# Patient Record
Sex: MALE | Race: WHITE | NOT HISPANIC OR LATINO | Employment: OTHER
[De-identification: names, ages, dates, MRNs, and addresses within clinical notes are randomized per-mention and may not be internally consistent; named-entity substitution may affect disease eponyms.]

---

## 2017-10-01 ENCOUNTER — RECORDS - HEALTHEAST (OUTPATIENT)
Dept: ADMINISTRATIVE | Facility: OTHER | Age: 67
End: 2017-10-01

## 2017-11-20 ENCOUNTER — RECORDS - HEALTHEAST (OUTPATIENT)
Dept: ADMINISTRATIVE | Facility: OTHER | Age: 67
End: 2017-11-20

## 2017-11-20 LAB
LAB AP CHARGES (HE HISTORICAL CONVERSION): NORMAL
PATH REPORT.COMMENTS IMP SPEC: NORMAL
PATH REPORT.COMMENTS IMP SPEC: NORMAL
PATH REPORT.FINAL DX SPEC: NORMAL
PATH REPORT.GROSS SPEC: NORMAL
PATH REPORT.MICROSCOPIC SPEC OTHER STN: NORMAL
PATH REPORT.MICROSCOPIC SPEC OTHER STN: NORMAL
PATH REPORT.RELEVANT HX SPEC: NORMAL
RESULT FLAG (HE HISTORICAL CONVERSION): NORMAL

## 2018-01-01 ENCOUNTER — OFFICE VISIT - HEALTHEAST (OUTPATIENT)
Dept: GERIATRICS | Facility: CLINIC | Age: 68
End: 2018-01-01

## 2018-01-01 ENCOUNTER — INFUSION - HEALTHEAST (OUTPATIENT)
Dept: INFUSION THERAPY | Facility: CLINIC | Age: 68
End: 2018-01-01

## 2018-01-01 ENCOUNTER — HOME CARE/HOSPICE - HEALTHEAST (OUTPATIENT)
Dept: HOME HEALTH SERVICES | Facility: HOME HEALTH | Age: 68
End: 2018-01-01

## 2018-01-01 ENCOUNTER — HOSPITAL ENCOUNTER (OUTPATIENT)
Dept: CARDIOLOGY | Facility: HOSPITAL | Age: 68
Discharge: HOME | End: 2018-11-19
Attending: INTERNAL MEDICINE

## 2018-01-01 ENCOUNTER — COMMUNICATION - HEALTHEAST (OUTPATIENT)
Dept: INFECTIOUS DISEASES | Facility: CLINIC | Age: 68
End: 2018-01-01

## 2018-01-01 ENCOUNTER — HOSPITAL ENCOUNTER (OUTPATIENT)
Dept: RADIOLOGY | Facility: HOSPITAL | Age: 68
Discharge: HOME | End: 2018-09-12
Attending: INTERNAL MEDICINE

## 2018-01-01 ENCOUNTER — COMMUNICATION - HEALTHEAST (OUTPATIENT)
Dept: ADMINISTRATIVE | Facility: CLINIC | Age: 68
End: 2018-01-01

## 2018-01-01 ENCOUNTER — RECORDS - HEALTHEAST (OUTPATIENT)
Dept: ADMINISTRATIVE | Facility: OTHER | Age: 68
End: 2018-01-01

## 2018-01-01 ENCOUNTER — OFFICE VISIT - HEALTHEAST (OUTPATIENT)
Dept: INFECTIOUS DISEASES | Facility: CLINIC | Age: 68
End: 2018-01-01

## 2018-01-01 ENCOUNTER — RECORDS - HEALTHEAST (OUTPATIENT)
Dept: LAB | Facility: CLINIC | Age: 68
End: 2018-01-01

## 2018-01-01 ENCOUNTER — SURGERY - HEALTHEAST (OUTPATIENT)
Dept: SURGERY | Facility: HOSPITAL | Age: 68
End: 2018-01-01

## 2018-01-01 ENCOUNTER — AMBULATORY - HEALTHEAST (OUTPATIENT)
Dept: INFUSION THERAPY | Facility: HOSPITAL | Age: 68
End: 2018-01-01

## 2018-01-01 ENCOUNTER — COMMUNICATION - HEALTHEAST (OUTPATIENT)
Dept: CARDIOLOGY | Facility: HOSPITAL | Age: 68
End: 2018-01-01

## 2018-01-01 ENCOUNTER — INFUSION - HEALTHEAST (OUTPATIENT)
Dept: INFUSION THERAPY | Facility: HOSPITAL | Age: 68
End: 2018-01-01

## 2018-01-01 ENCOUNTER — AMBULATORY - HEALTHEAST (OUTPATIENT)
Dept: GERIATRICS | Facility: CLINIC | Age: 68
End: 2018-01-01

## 2018-01-01 ENCOUNTER — HOSPITAL ENCOUNTER (OUTPATIENT)
Dept: CT IMAGING | Facility: HOSPITAL | Age: 68
Discharge: HOME | End: 2018-12-20
Attending: FAMILY MEDICINE

## 2018-01-01 ENCOUNTER — ANESTHESIA - HEALTHEAST (OUTPATIENT)
Dept: SURGERY | Facility: HOSPITAL | Age: 68
End: 2018-01-01

## 2018-01-01 DIAGNOSIS — R78.81 MRSA BACTEREMIA: ICD-10-CM

## 2018-01-01 DIAGNOSIS — F10.10 ALCOHOL ABUSE: ICD-10-CM

## 2018-01-01 DIAGNOSIS — I48.21 PERMANENT ATRIAL FIBRILLATION (H): ICD-10-CM

## 2018-01-01 DIAGNOSIS — B95.62 MRSA BACTEREMIA: ICD-10-CM

## 2018-01-01 DIAGNOSIS — G93.40 ENCEPHALOPATHY: ICD-10-CM

## 2018-01-01 DIAGNOSIS — N28.89 RENAL MASS: ICD-10-CM

## 2018-01-01 DIAGNOSIS — I96 GANGRENE OF TOE OF RIGHT FOOT (H): ICD-10-CM

## 2018-01-01 DIAGNOSIS — R46.89 COGNITIVE AND BEHAVIORAL CHANGES: ICD-10-CM

## 2018-01-01 DIAGNOSIS — M86.9 TOE OSTEOMYELITIS (H): ICD-10-CM

## 2018-01-01 DIAGNOSIS — F02.80 ALZHEIMER'S DEMENTIA WITHOUT BEHAVIORAL DISTURBANCE, UNSPECIFIED TIMING OF DEMENTIA ONSET: ICD-10-CM

## 2018-01-01 DIAGNOSIS — Z87.39 HISTORY OF OSTEOMYELITIS: ICD-10-CM

## 2018-01-01 DIAGNOSIS — L08.9 DIABETIC FOOT INFECTION (H): ICD-10-CM

## 2018-01-01 DIAGNOSIS — E11.628 DIABETIC FOOT INFECTION (H): ICD-10-CM

## 2018-01-01 DIAGNOSIS — Z86.79 HISTORY OF ATRIAL FIBRILLATION: ICD-10-CM

## 2018-01-01 DIAGNOSIS — R60.0 LOWER EXTREMITY EDEMA: ICD-10-CM

## 2018-01-01 DIAGNOSIS — G30.9 ALZHEIMER'S DEMENTIA WITHOUT BEHAVIORAL DISTURBANCE, UNSPECIFIED TIMING OF DEMENTIA ONSET: ICD-10-CM

## 2018-01-01 DIAGNOSIS — I10 ESSENTIAL HYPERTENSION: ICD-10-CM

## 2018-01-01 DIAGNOSIS — G47.9 SLEEP DIFFICULTIES: ICD-10-CM

## 2018-01-01 DIAGNOSIS — A41.89 GRAM POSITIVE SEPSIS (H): ICD-10-CM

## 2018-01-01 DIAGNOSIS — I35.8 AORTIC VALVE ENDOCARDITIS: ICD-10-CM

## 2018-01-01 DIAGNOSIS — F32.A DEPRESSION: ICD-10-CM

## 2018-01-01 DIAGNOSIS — R41.89 COGNITIVE AND BEHAVIORAL CHANGES: ICD-10-CM

## 2018-01-01 DIAGNOSIS — R78.81 BACTEREMIA: ICD-10-CM

## 2018-01-01 LAB
ALBUMIN SERPL-MCNC: 2 G/DL (ref 3.5–5)
ALBUMIN SERPL-MCNC: 2 G/DL (ref 3.5–5)
ALBUMIN SERPL-MCNC: 2.2 G/DL (ref 3.5–5)
ALBUMIN SERPL-MCNC: 2.2 G/DL (ref 3.5–5)
ALBUMIN SERPL-MCNC: 2.3 G/DL (ref 3.5–5)
ALP SERPL-CCNC: 107 U/L (ref 45–120)
ALP SERPL-CCNC: 109 U/L (ref 45–120)
ALP SERPL-CCNC: 121 U/L (ref 45–120)
ALP SERPL-CCNC: 129 U/L (ref 45–120)
ALP SERPL-CCNC: 139 U/L (ref 45–120)
ALT SERPL W P-5'-P-CCNC: 25 U/L (ref 0–45)
ALT SERPL W P-5'-P-CCNC: 26 U/L (ref 0–45)
ALT SERPL W P-5'-P-CCNC: 28 U/L (ref 0–45)
ALT SERPL W P-5'-P-CCNC: 32 U/L (ref 0–45)
ALT SERPL W P-5'-P-CCNC: 43 U/L (ref 0–45)
ANION GAP SERPL CALCULATED.3IONS-SCNC: 5 MMOL/L (ref 5–18)
ANION GAP SERPL CALCULATED.3IONS-SCNC: 5 MMOL/L (ref 5–18)
ANION GAP SERPL CALCULATED.3IONS-SCNC: 6 MMOL/L (ref 5–18)
ANION GAP SERPL CALCULATED.3IONS-SCNC: 6 MMOL/L (ref 5–18)
ANION GAP SERPL CALCULATED.3IONS-SCNC: 7 MMOL/L (ref 5–18)
ANION GAP SERPL CALCULATED.3IONS-SCNC: 7 MMOL/L (ref 5–18)
AORTIC ROOT: 3.2 CM
AORTIC ROOT: 3.2 CM
AST SERPL W P-5'-P-CCNC: 46 U/L (ref 0–40)
AST SERPL W P-5'-P-CCNC: 50 U/L (ref 0–40)
AST SERPL W P-5'-P-CCNC: 52 U/L (ref 0–40)
AST SERPL W P-5'-P-CCNC: 56 U/L (ref 0–40)
AST SERPL W P-5'-P-CCNC: 67 U/L (ref 0–40)
BASOPHILS # BLD AUTO: 0 THOU/UL (ref 0–0.2)
BASOPHILS # BLD AUTO: 0.1 THOU/UL (ref 0–0.2)
BASOPHILS # BLD AUTO: 0.2 THOU/UL (ref 0–0.2)
BASOPHILS # BLD AUTO: 0.2 THOU/UL (ref 0–0.2)
BASOPHILS NFR BLD AUTO: 1 % (ref 0–2)
BASOPHILS NFR BLD AUTO: 2 % (ref 0–2)
BASOPHILS NFR BLD AUTO: 2 % (ref 0–2)
BASOPHILS NFR BLD AUTO: 4 % (ref 0–2)
BILIRUB DIRECT SERPL-MCNC: 0.5 MG/DL
BILIRUB SERPL-MCNC: 0.9 MG/DL (ref 0–1)
BILIRUB SERPL-MCNC: 1 MG/DL (ref 0–1)
BILIRUB SERPL-MCNC: 1 MG/DL (ref 0–1)
BILIRUB SERPL-MCNC: 1.2 MG/DL (ref 0–1)
BILIRUB SERPL-MCNC: 1.3 MG/DL (ref 0–1)
BSA FOR ECHO PROCEDURE: 2.17 M2
BUN SERPL-MCNC: 10 MG/DL (ref 8–22)
BUN SERPL-MCNC: 11 MG/DL (ref 8–22)
BUN SERPL-MCNC: 13 MG/DL (ref 8–22)
BUN SERPL-MCNC: 14 MG/DL (ref 8–22)
BUN SERPL-MCNC: 15 MG/DL (ref 8–22)
BUN SERPL-MCNC: 16 MG/DL (ref 8–22)
C REACTIVE PROTEIN LHE: 11.7 MG/DL (ref 0–0.8)
C REACTIVE PROTEIN LHE: 2.5 MG/DL (ref 0–0.8)
C REACTIVE PROTEIN LHE: 2.7 MG/DL (ref 0–0.8)
C REACTIVE PROTEIN LHE: 3 MG/DL (ref 0–0.8)
C REACTIVE PROTEIN LHE: 5.1 MG/DL (ref 0–0.8)
CALCIUM SERPL-MCNC: 8.1 MG/DL (ref 8.5–10.5)
CALCIUM SERPL-MCNC: 8.2 MG/DL (ref 8.5–10.5)
CALCIUM SERPL-MCNC: 8.3 MG/DL (ref 8.5–10.5)
CALCIUM SERPL-MCNC: 8.3 MG/DL (ref 8.5–10.5)
CALCIUM SERPL-MCNC: 8.5 MG/DL (ref 8.5–10.5)
CALCIUM SERPL-MCNC: 8.9 MG/DL (ref 8.5–10.5)
CHLORIDE BLD-SCNC: 105 MMOL/L (ref 98–107)
CHLORIDE BLD-SCNC: 107 MMOL/L (ref 98–107)
CHLORIDE BLD-SCNC: 108 MMOL/L (ref 98–107)
CHLORIDE BLD-SCNC: 109 MMOL/L (ref 98–107)
CK SERPL-CCNC: 134 U/L (ref 30–190)
CK SERPL-CCNC: 143 U/L (ref 30–190)
CK SERPL-CCNC: 51 U/L (ref 30–190)
CK SERPL-CCNC: 73 U/L (ref 30–190)
CK SERPL-CCNC: 76 U/L (ref 30–190)
CO2 SERPL-SCNC: 25 MMOL/L (ref 22–31)
CO2 SERPL-SCNC: 26 MMOL/L (ref 22–31)
CO2 SERPL-SCNC: 27 MMOL/L (ref 22–31)
CREAT SERPL-MCNC: 1.33 MG/DL (ref 0.7–1.3)
CREAT SERPL-MCNC: 1.45 MG/DL (ref 0.7–1.3)
CREAT SERPL-MCNC: 1.51 MG/DL (ref 0.7–1.3)
CREAT SERPL-MCNC: 1.53 MG/DL (ref 0.7–1.3)
CREAT SERPL-MCNC: 1.9 MG/DL (ref 0.7–1.3)
CREAT SERPL-MCNC: 2.13 MG/DL (ref 0.7–1.3)
CV BLOOD PRESSURE: ABNORMAL MMHG
CV ECHO HEIGHT: 72 IN
CV ECHO WEIGHT: 205 LBS
DOP CALC LVOT AREA: 3.46 CM2
DOP CALC LVOT DIAMETER: 2.1 CM
DOP CALC LVOT PEAK VEL: 108 CM/S
DOP CALC LVOT STROKE VOLUME: 71.7 CM3
DOP CALCLVOT PEAK VEL VTI: 20.7 CM
EJECTION FRACTION: 68 % (ref 55–75)
EOSINOPHIL # BLD AUTO: 0.2 THOU/UL (ref 0–0.4)
EOSINOPHIL # BLD AUTO: 0.3 THOU/UL (ref 0–0.4)
EOSINOPHIL # BLD AUTO: 0.4 THOU/UL (ref 0–0.4)
EOSINOPHIL # BLD AUTO: 0.6 THOU/UL (ref 0–0.4)
EOSINOPHIL COUNT (ABSOLUTE): 0.2 THOU/UL (ref 0–0.4)
EOSINOPHIL COUNT (ABSOLUTE): 0.3 THOU/UL (ref 0–0.4)
EOSINOPHIL NFR BLD AUTO: 3 % (ref 0–6)
EOSINOPHIL NFR BLD AUTO: 5 % (ref 0–6)
EOSINOPHIL NFR BLD AUTO: 6 % (ref 0–6)
EOSINOPHIL NFR BLD AUTO: 7 % (ref 0–6)
EOSINOPHIL NFR BLD AUTO: 9 % (ref 0–6)
EOSINOPHIL NFR BLD AUTO: 9 % (ref 0–6)
ERYTHROCYTE [DISTWIDTH] IN BLOOD BY AUTOMATED COUNT: 13.9 % (ref 11–14.5)
ERYTHROCYTE [DISTWIDTH] IN BLOOD BY AUTOMATED COUNT: 14 % (ref 11–14.5)
ERYTHROCYTE [DISTWIDTH] IN BLOOD BY AUTOMATED COUNT: 14.3 % (ref 11–14.5)
ERYTHROCYTE [DISTWIDTH] IN BLOOD BY AUTOMATED COUNT: 15.9 % (ref 11–14.5)
ERYTHROCYTE [SEDIMENTATION RATE] IN BLOOD BY WESTERGREN METHOD: 35 MM/HR (ref 0–15)
ERYTHROCYTE [SEDIMENTATION RATE] IN BLOOD BY WESTERGREN METHOD: 37 MM/HR (ref 0–15)
ERYTHROCYTE [SEDIMENTATION RATE] IN BLOOD BY WESTERGREN METHOD: 41 MM/HR (ref 0–15)
ERYTHROCYTE [SEDIMENTATION RATE] IN BLOOD BY WESTERGREN METHOD: 41 MM/HR (ref 0–15)
ERYTHROCYTE [SEDIMENTATION RATE] IN BLOOD BY WESTERGREN METHOD: 48 MM/HR (ref 0–15)
FRACTIONAL SHORTENING: 36.1 % (ref 28–44)
GFR SERPL CREATININE-BSD FRML MDRD: 31 ML/MIN/1.73M2
GFR SERPL CREATININE-BSD FRML MDRD: 36 ML/MIN/1.73M2
GFR SERPL CREATININE-BSD FRML MDRD: 46 ML/MIN/1.73M2
GFR SERPL CREATININE-BSD FRML MDRD: 46 ML/MIN/1.73M2
GFR SERPL CREATININE-BSD FRML MDRD: 49 ML/MIN/1.73M2
GFR SERPL CREATININE-BSD FRML MDRD: 54 ML/MIN/1.73M2
GLUCOSE BLD-MCNC: 101 MG/DL (ref 70–125)
GLUCOSE BLD-MCNC: 105 MG/DL (ref 70–125)
GLUCOSE BLD-MCNC: 118 MG/DL (ref 70–125)
GLUCOSE BLD-MCNC: 87 MG/DL (ref 70–125)
GLUCOSE BLD-MCNC: 90 MG/DL (ref 70–125)
GLUCOSE BLD-MCNC: 93 MG/DL (ref 70–125)
HCT VFR BLD AUTO: 29.2 % (ref 40–54)
HCT VFR BLD AUTO: 31 % (ref 40–54)
HCT VFR BLD AUTO: 33.5 % (ref 40–54)
HCT VFR BLD AUTO: 33.8 % (ref 40–54)
HCT VFR BLD AUTO: 34.7 % (ref 40–54)
HCT VFR BLD AUTO: 37.1 % (ref 40–54)
HGB BLD-MCNC: 10.3 G/DL (ref 14–18)
HGB BLD-MCNC: 10.8 G/DL (ref 14–18)
HGB BLD-MCNC: 11.3 G/DL (ref 14–18)
HGB BLD-MCNC: 11.4 G/DL (ref 14–18)
HGB BLD-MCNC: 11.5 G/DL (ref 14–18)
HGB BLD-MCNC: 12.1 G/DL (ref 14–18)
INTERVENTRICULAR SEPTUM IN END DIASTOLE: 1.19 CM (ref 0.6–1)
IVS/PW RATIO: 1
LA AREA 1: 29 CM2
LA AREA 2: 17 CM2
LEFT ATRIUM LENGTH: 6.3 CM
LEFT ATRIUM SIZE: 4.7 CM
LEFT ATRIUM VOLUME INDEX: 30.7 ML/M2
LEFT ATRIUM VOLUME: 66.5 ML
LEFT VENTRICLE CARDIAC INDEX: 3.1 L/MIN/M2
LEFT VENTRICLE CARDIAC OUTPUT: 6.8 L/MIN
LEFT VENTRICLE DIASTOLIC VOLUME INDEX: 45.4 CM3/M2 (ref 34–74)
LEFT VENTRICLE DIASTOLIC VOLUME: 98.6 CM3 (ref 62–150)
LEFT VENTRICLE HEART RATE: 95 BPM
LEFT VENTRICLE MASS INDEX: 120.7 G/M2
LEFT VENTRICLE SYSTOLIC VOLUME INDEX: 14.6 CM3/M2 (ref 11–31)
LEFT VENTRICLE SYSTOLIC VOLUME: 31.6 CM3 (ref 21–61)
LEFT VENTRICULAR INTERNAL DIMENSION IN DIASTOLE: 5.35 CM (ref 4.2–5.8)
LEFT VENTRICULAR INTERNAL DIMENSION IN SYSTOLE: 3.42 CM (ref 2.5–4)
LEFT VENTRICULAR MASS: 262 G
LEFT VENTRICULAR OUTFLOW TRACT MEAN GRADIENT: 3 MMHG
LEFT VENTRICULAR OUTFLOW TRACT MEAN VELOCITY: 76.2 CM/S
LEFT VENTRICULAR OUTFLOW TRACT PEAK GRADIENT: 5 MMHG
LEFT VENTRICULAR POSTERIOR WALL IN END DIASTOLE: 1.22 CM (ref 0.6–1)
LV STROKE VOLUME INDEX: 33 ML/M2
LYMPHOCYTES # BLD AUTO: 0.8 THOU/UL (ref 0.8–4.4)
LYMPHOCYTES # BLD AUTO: 0.8 THOU/UL (ref 0.8–4.4)
LYMPHOCYTES # BLD AUTO: 1.1 THOU/UL (ref 0.8–4.4)
LYMPHOCYTES # BLD AUTO: 1.2 THOU/UL (ref 0.8–4.4)
LYMPHOCYTES # BLD AUTO: 1.3 THOU/UL (ref 0.8–4.4)
LYMPHOCYTES # BLD AUTO: 1.3 THOU/UL (ref 0.8–4.4)
LYMPHOCYTES NFR BLD AUTO: 13 % (ref 20–40)
LYMPHOCYTES NFR BLD AUTO: 17 % (ref 20–40)
LYMPHOCYTES NFR BLD AUTO: 18 % (ref 20–40)
LYMPHOCYTES NFR BLD AUTO: 23 % (ref 20–40)
LYMPHOCYTES NFR BLD AUTO: 28 % (ref 20–40)
LYMPHOCYTES NFR BLD AUTO: 34 % (ref 20–40)
MAGNESIUM SERPL-MCNC: 1.8 MG/DL (ref 1.8–2.6)
MCH RBC QN AUTO: 31.6 PG (ref 27–34)
MCH RBC QN AUTO: 32.9 PG (ref 27–34)
MCH RBC QN AUTO: 32.9 PG (ref 27–34)
MCH RBC QN AUTO: 33.3 PG (ref 27–34)
MCH RBC QN AUTO: 33.5 PG (ref 27–34)
MCH RBC QN AUTO: 33.9 PG (ref 27–34)
MCHC RBC AUTO-ENTMCNC: 32.6 G/DL (ref 32–36)
MCHC RBC AUTO-ENTMCNC: 32.6 G/DL (ref 32–36)
MCHC RBC AUTO-ENTMCNC: 34 G/DL (ref 32–36)
MCHC RBC AUTO-ENTMCNC: 34 G/DL (ref 32–36)
MCHC RBC AUTO-ENTMCNC: 34.8 G/DL (ref 32–36)
MCHC RBC AUTO-ENTMCNC: 35.3 G/DL (ref 32–36)
MCV RBC AUTO: 101 FL (ref 80–100)
MCV RBC AUTO: 96 FL (ref 80–100)
MCV RBC AUTO: 96 FL (ref 80–100)
MCV RBC AUTO: 97 FL (ref 80–100)
MCV RBC AUTO: 97 FL (ref 80–100)
MCV RBC AUTO: 98 FL (ref 80–100)
MITRAL REGURGITANT VELOCITY TIME INTEGRAL: 143 CM
MITRAL VALVE E/A RATIO: 1.6
MONOCYTES # BLD AUTO: 0.1 THOU/UL (ref 0–0.9)
MONOCYTES # BLD AUTO: 0.2 THOU/UL (ref 0–0.9)
MONOCYTES # BLD AUTO: 0.3 THOU/UL (ref 0–0.9)
MONOCYTES # BLD AUTO: 0.4 THOU/UL (ref 0–0.9)
MONOCYTES # BLD AUTO: 0.4 THOU/UL (ref 0–0.9)
MONOCYTES # BLD AUTO: 0.5 THOU/UL (ref 0–0.9)
MONOCYTES NFR BLD AUTO: 2 % (ref 2–10)
MONOCYTES NFR BLD AUTO: 5 % (ref 2–10)
MONOCYTES NFR BLD AUTO: 6 % (ref 2–10)
MONOCYTES NFR BLD AUTO: 7 % (ref 2–10)
MONOCYTES NFR BLD AUTO: 8 % (ref 2–10)
MONOCYTES NFR BLD AUTO: 9 % (ref 2–10)
MR FLOW: 32 CM3
MR MEAN GRADIENT: 76 MMHG
MR MEAN VELOCITY: 413 CM/S
MR PEAK GRADIENT: 108.6 MMHG
MV DECELERATION TIME: 212 MS
MV PEAK A VELOCITY: 54.1 CM/S
MV PEAK E VELOCITY: 85.9 CM/S
NEUTROPHILS # BLD AUTO: 1.6 THOU/UL (ref 2–7.7)
NEUTROPHILS # BLD AUTO: 3.8 THOU/UL (ref 2–7.7)
NEUTROPHILS # BLD AUTO: 4 THOU/UL (ref 2–7.7)
NEUTROPHILS # BLD AUTO: 4.6 THOU/UL (ref 2–7.7)
NEUTROPHILS NFR BLD AUTO: 47 % (ref 50–70)
NEUTROPHILS NFR BLD AUTO: 66 % (ref 50–70)
NEUTROPHILS NFR BLD AUTO: 66 % (ref 50–70)
NEUTROPHILS NFR BLD AUTO: 67 % (ref 50–70)
NUC REST DIASTOLIC VOLUME INDEX: 3280 LBS
NUC REST SYSTOLIC VOLUME INDEX: 72 IN
PHOSPHATE SERPL-MCNC: 3.7 MG/DL (ref 2.5–4.5)
PISA MR PEAK VEL: 521 CM/S
PLAT MORPH BLD: ABNORMAL
PLAT MORPH BLD: ABNORMAL
PLATELET # BLD AUTO: 111 THOU/UL (ref 140–440)
PLATELET # BLD AUTO: 113 THOU/UL (ref 140–440)
PLATELET # BLD AUTO: 125 THOU/UL (ref 140–440)
PLATELET # BLD AUTO: 128 THOU/UL (ref 140–440)
PLATELET # BLD AUTO: 141 THOU/UL (ref 140–440)
PLATELET # BLD AUTO: 87 THOU/UL (ref 140–440)
PMV BLD AUTO: 10.4 FL (ref 8.5–12.5)
PMV BLD AUTO: 10.5 FL (ref 8.5–12.5)
PMV BLD AUTO: 11.2 FL (ref 8.5–12.5)
PMV BLD AUTO: 11.3 FL (ref 8.5–12.5)
PMV BLD AUTO: 11.3 FL (ref 8.5–12.5)
PMV BLD AUTO: 11.6 FL (ref 8.5–12.5)
POTASSIUM BLD-SCNC: 3.1 MMOL/L (ref 3.5–5)
POTASSIUM BLD-SCNC: 3.4 MMOL/L (ref 3.5–5)
POTASSIUM BLD-SCNC: 3.6 MMOL/L (ref 3.5–5)
POTASSIUM BLD-SCNC: 3.6 MMOL/L (ref 3.5–5)
POTASSIUM BLD-SCNC: 4 MMOL/L (ref 3.5–5)
POTASSIUM BLD-SCNC: 4.7 MMOL/L (ref 3.5–5)
PROT SERPL-MCNC: 6.6 G/DL (ref 6–8)
PROT SERPL-MCNC: 6.6 G/DL (ref 6–8)
PROT SERPL-MCNC: 7.2 G/DL (ref 6–8)
PROT SERPL-MCNC: 7.4 G/DL (ref 6–8)
PROT SERPL-MCNC: 7.7 G/DL (ref 6–8)
RBC # BLD AUTO: 3.04 MILL/UL (ref 4.4–6.2)
RBC # BLD AUTO: 3.22 MILL/UL (ref 4.4–6.2)
RBC # BLD AUTO: 3.45 MILL/UL (ref 4.4–6.2)
RBC # BLD AUTO: 3.47 MILL/UL (ref 4.4–6.2)
RBC # BLD AUTO: 3.58 MILL/UL (ref 4.4–6.2)
RBC # BLD AUTO: 3.68 MILL/UL (ref 4.4–6.2)
SODIUM SERPL-SCNC: 137 MMOL/L (ref 136–145)
SODIUM SERPL-SCNC: 139 MMOL/L (ref 136–145)
SODIUM SERPL-SCNC: 140 MMOL/L (ref 136–145)
SODIUM SERPL-SCNC: 141 MMOL/L (ref 136–145)
TOTAL NEUTROPHILS-ABS(DIFF): 1.7 THOU/UL (ref 2–7.7)
TOTAL NEUTROPHILS-ABS(DIFF): 4.5 THOU/UL (ref 2–7.7)
TOTAL NEUTROPHILS-REL(DIFF): 58 % (ref 50–70)
TOTAL NEUTROPHILS-REL(DIFF): 76 % (ref 50–70)
TRICUSPID REGURGITATION PEAK PRESSURE GRADIENT: 29.8 MMHG
TRICUSPID VALVE ANULAR PLANE SYSTOLIC EXCURSION: 1.7 CM
TRICUSPID VALVE PEAK REGURGITANT VELOCITY: 273 CM/S
WBC: 3 THOU/UL (ref 4–11)
WBC: 3.5 THOU/UL (ref 4–11)
WBC: 5.9 THOU/UL (ref 4–11)
WBC: 5.9 THOU/UL (ref 4–11)
WBC: 6 THOU/UL (ref 4–11)
WBC: 7 THOU/UL (ref 4–11)

## 2018-01-01 ASSESSMENT — MIFFLIN-ST. JEOR
SCORE: 1692.95
SCORE: 1715.63
SCORE: 1795.46
SCORE: 1523.76
SCORE: 1730.6
SCORE: 1739.21
SCORE: 1722.88
SCORE: 1751.91
SCORE: 1739.21
SCORE: 1673.44
SCORE: 1741.94
SCORE: 1727.87
SCORE: 1759.63

## 2018-02-22 ENCOUNTER — RECORDS - HEALTHEAST (OUTPATIENT)
Dept: LAB | Facility: CLINIC | Age: 68
End: 2018-02-22

## 2018-02-22 LAB
CHOLEST SERPL-MCNC: 167 MG/DL
FASTING STATUS PATIENT QL REPORTED: NORMAL
HDLC SERPL-MCNC: 53 MG/DL
LDLC SERPL CALC-MCNC: 102 MG/DL
TRIGL SERPL-MCNC: 62 MG/DL

## 2018-02-27 ENCOUNTER — AMBULATORY - HEALTHEAST (OUTPATIENT)
Dept: CARDIOLOGY | Facility: CLINIC | Age: 68
End: 2018-02-27

## 2018-04-05 ENCOUNTER — TRANSFERRED RECORDS (OUTPATIENT)
Dept: HEALTH INFORMATION MANAGEMENT | Facility: CLINIC | Age: 68
End: 2018-04-05

## 2018-04-06 ENCOUNTER — RECORDS - HEALTHEAST (OUTPATIENT)
Dept: ADMINISTRATIVE | Facility: OTHER | Age: 68
End: 2018-04-06

## 2018-04-09 ENCOUNTER — OFFICE VISIT - HEALTHEAST (OUTPATIENT)
Dept: CARDIOLOGY | Facility: CLINIC | Age: 68
End: 2018-04-09

## 2018-04-09 DIAGNOSIS — I48.19 PERSISTENT ATRIAL FIBRILLATION (H): ICD-10-CM

## 2018-04-09 ASSESSMENT — MIFFLIN-ST. JEOR: SCORE: 1836.74

## 2018-04-13 ENCOUNTER — HOSPITAL ENCOUNTER (OUTPATIENT)
Dept: CARDIOLOGY | Facility: HOSPITAL | Age: 68
Discharge: HOME | End: 2018-04-13
Attending: INTERNAL MEDICINE

## 2018-04-13 DIAGNOSIS — I48.19 PERSISTENT ATRIAL FIBRILLATION (H): ICD-10-CM

## 2019-01-01 ENCOUNTER — ALLIED HEALTH/NURSE VISIT (OUTPATIENT)
Dept: TRANSPLANT | Facility: CLINIC | Age: 69
End: 2019-01-01
Attending: INTERNAL MEDICINE
Payer: COMMERCIAL

## 2019-01-01 ENCOUNTER — COMMITTEE REVIEW (OUTPATIENT)
Dept: TRANSPLANT | Facility: CLINIC | Age: 69
End: 2019-01-01

## 2019-01-01 ENCOUNTER — MEDICAL CORRESPONDENCE (OUTPATIENT)
Dept: HEALTH INFORMATION MANAGEMENT | Facility: CLINIC | Age: 69
End: 2019-01-01

## 2019-01-01 ENCOUNTER — AMBULATORY - HEALTHEAST (OUTPATIENT)
Dept: CARDIOLOGY | Facility: CLINIC | Age: 69
End: 2019-01-01

## 2019-01-01 ENCOUNTER — ANCILLARY PROCEDURE (OUTPATIENT)
Dept: ULTRASOUND IMAGING | Facility: CLINIC | Age: 69
End: 2019-01-01
Payer: COMMERCIAL

## 2019-01-01 ENCOUNTER — HOSPITAL ENCOUNTER (OUTPATIENT)
Dept: CARDIOLOGY | Facility: CLINIC | Age: 69
Discharge: HOME | End: 2019-02-11
Attending: INTERNAL MEDICINE | Admitting: INTERNAL MEDICINE
Payer: COMMERCIAL

## 2019-01-01 ENCOUNTER — TRANSFERRED RECORDS (OUTPATIENT)
Dept: HEALTH INFORMATION MANAGEMENT | Facility: CLINIC | Age: 69
End: 2019-01-01

## 2019-01-01 ENCOUNTER — HOSPITAL ENCOUNTER (OUTPATIENT)
Dept: CARDIOLOGY | Facility: CLINIC | Age: 69
Discharge: HOME | End: 2019-02-12
Attending: INTERNAL MEDICINE | Admitting: INTERNAL MEDICINE
Payer: COMMERCIAL

## 2019-01-01 ENCOUNTER — HOSPITAL ENCOUNTER (OUTPATIENT)
Dept: NUCLEAR MEDICINE | Facility: CLINIC | Age: 69
Setting detail: NUCLEAR MEDICINE
End: 2019-02-12
Attending: INTERNAL MEDICINE
Payer: COMMERCIAL

## 2019-01-01 ENCOUNTER — ANCILLARY PROCEDURE (OUTPATIENT)
Dept: GENERAL RADIOLOGY | Facility: CLINIC | Age: 69
End: 2019-01-01
Payer: COMMERCIAL

## 2019-01-01 ENCOUNTER — ANCILLARY PROCEDURE (OUTPATIENT)
Dept: BONE DENSITY | Facility: CLINIC | Age: 69
End: 2019-01-01
Payer: COMMERCIAL

## 2019-01-01 ENCOUNTER — COMMUNICATION - HEALTHEAST (OUTPATIENT)
Dept: ADMINISTRATIVE | Facility: CLINIC | Age: 69
End: 2019-01-01

## 2019-01-01 ENCOUNTER — DOCUMENTATION ONLY (OUTPATIENT)
Dept: TRANSPLANT | Facility: CLINIC | Age: 69
End: 2019-01-01

## 2019-01-01 ENCOUNTER — TELEPHONE (OUTPATIENT)
Dept: TRANSPLANT | Facility: CLINIC | Age: 69
End: 2019-01-01

## 2019-01-01 ENCOUNTER — ANCILLARY PROCEDURE (OUTPATIENT)
Dept: MRI IMAGING | Facility: CLINIC | Age: 69
End: 2019-01-01
Attending: PSYCHIATRY & NEUROLOGY
Payer: COMMERCIAL

## 2019-01-01 ENCOUNTER — OFFICE VISIT (OUTPATIENT)
Dept: NEUROPSYCHOLOGY | Facility: CLINIC | Age: 69
End: 2019-01-01
Payer: COMMERCIAL

## 2019-01-01 ENCOUNTER — OFFICE VISIT (OUTPATIENT)
Dept: CARDIOLOGY | Facility: CLINIC | Age: 69
End: 2019-01-01
Attending: INTERNAL MEDICINE
Payer: COMMERCIAL

## 2019-01-01 ENCOUNTER — OFFICE VISIT (OUTPATIENT)
Dept: TRANSPLANT | Facility: CLINIC | Age: 69
End: 2019-01-01
Attending: TRANSPLANT SURGERY
Payer: COMMERCIAL

## 2019-01-01 ENCOUNTER — REFERRAL (OUTPATIENT)
Dept: TRANSPLANT | Facility: CLINIC | Age: 69
End: 2019-01-01

## 2019-01-01 ENCOUNTER — DOCUMENTATION ONLY (OUTPATIENT)
Dept: CARE COORDINATION | Facility: CLINIC | Age: 69
End: 2019-01-01

## 2019-01-01 ENCOUNTER — ANCILLARY PROCEDURE (OUTPATIENT)
Dept: CT IMAGING | Facility: CLINIC | Age: 69
End: 2019-01-01
Attending: INTERNAL MEDICINE
Payer: COMMERCIAL

## 2019-01-01 ENCOUNTER — OFFICE VISIT (OUTPATIENT)
Dept: NEUROLOGY | Facility: CLINIC | Age: 69
End: 2019-01-01
Attending: INTERNAL MEDICINE
Payer: COMMERCIAL

## 2019-01-01 ENCOUNTER — OFFICE VISIT (OUTPATIENT)
Dept: GASTROENTEROLOGY | Facility: CLINIC | Age: 69
End: 2019-01-01
Attending: INTERNAL MEDICINE
Payer: COMMERCIAL

## 2019-01-01 VITALS
HEIGHT: 72 IN | TEMPERATURE: 97.5 F | HEART RATE: 79 BPM | SYSTOLIC BLOOD PRESSURE: 136 MMHG | DIASTOLIC BLOOD PRESSURE: 77 MMHG | OXYGEN SATURATION: 100 % | BODY MASS INDEX: 25.09 KG/M2 | RESPIRATION RATE: 16 BRPM | WEIGHT: 185.2 LBS

## 2019-01-01 VITALS — BODY MASS INDEX: 29.26 KG/M2 | HEIGHT: 72 IN | WEIGHT: 216 LBS

## 2019-01-01 VITALS
HEART RATE: 72 BPM | HEIGHT: 72 IN | BODY MASS INDEX: 23.98 KG/M2 | DIASTOLIC BLOOD PRESSURE: 70 MMHG | OXYGEN SATURATION: 99 % | TEMPERATURE: 97.6 F | WEIGHT: 177 LBS | SYSTOLIC BLOOD PRESSURE: 110 MMHG

## 2019-01-01 VITALS
TEMPERATURE: 97.6 F | HEART RATE: 72 BPM | BODY MASS INDEX: 24.03 KG/M2 | DIASTOLIC BLOOD PRESSURE: 70 MMHG | HEIGHT: 72 IN | SYSTOLIC BLOOD PRESSURE: 110 MMHG | OXYGEN SATURATION: 99 % | WEIGHT: 177.4 LBS

## 2019-01-01 DIAGNOSIS — I63.9 CEREBROVASCULAR ACCIDENT (CVA), UNSPECIFIED MECHANISM (H): ICD-10-CM

## 2019-01-01 DIAGNOSIS — K74.60 LIVER CIRRHOSIS SECONDARY TO NASH (H): ICD-10-CM

## 2019-01-01 DIAGNOSIS — K74.60 CIRRHOSIS OF LIVER WITH ASCITES, UNSPECIFIED HEPATIC CIRRHOSIS TYPE (H): Primary | ICD-10-CM

## 2019-01-01 DIAGNOSIS — F02.80 ALZHEIMER'S DEMENTIA WITHOUT BEHAVIORAL DISTURBANCE, UNSPECIFIED TIMING OF DEMENTIA ONSET: Primary | ICD-10-CM

## 2019-01-01 DIAGNOSIS — I48.21 PERMANENT ATRIAL FIBRILLATION (H): ICD-10-CM

## 2019-01-01 DIAGNOSIS — I48.20 CHRONIC ATRIAL FIBRILLATION (H): Primary | ICD-10-CM

## 2019-01-01 DIAGNOSIS — R18.8 CIRRHOSIS OF LIVER WITH ASCITES, UNSPECIFIED HEPATIC CIRRHOSIS TYPE (H): ICD-10-CM

## 2019-01-01 DIAGNOSIS — I48.91 ATRIAL FIBRILLATION (H): ICD-10-CM

## 2019-01-01 DIAGNOSIS — G51.0 BELL'S PALSY: ICD-10-CM

## 2019-01-01 DIAGNOSIS — K74.60 CIRRHOSIS OF LIVER WITH ASCITES, UNSPECIFIED HEPATIC CIRRHOSIS TYPE (H): ICD-10-CM

## 2019-01-01 DIAGNOSIS — R18.8 OTHER ASCITES: ICD-10-CM

## 2019-01-01 DIAGNOSIS — F02.80 LATE ONSET ALZHEIMER'S DISEASE WITHOUT BEHAVIORAL DISTURBANCE (H): ICD-10-CM

## 2019-01-01 DIAGNOSIS — K74.60 CIRRHOSIS OF LIVER (H): ICD-10-CM

## 2019-01-01 DIAGNOSIS — G30.1 LATE ONSET ALZHEIMER'S DISEASE WITHOUT BEHAVIORAL DISTURBANCE (H): ICD-10-CM

## 2019-01-01 DIAGNOSIS — R41.3 MEMORY LOSS: ICD-10-CM

## 2019-01-01 DIAGNOSIS — K76.6 PORTAL HYPERTENSION (H): ICD-10-CM

## 2019-01-01 DIAGNOSIS — R18.8 CIRRHOSIS OF LIVER WITH ASCITES, UNSPECIFIED HEPATIC CIRRHOSIS TYPE (H): Primary | ICD-10-CM

## 2019-01-01 DIAGNOSIS — R94.01 EEG ABNORMALITY: ICD-10-CM

## 2019-01-01 DIAGNOSIS — K74.60 CIRRHOSIS OF LIVER (H): Primary | ICD-10-CM

## 2019-01-01 DIAGNOSIS — Z01.810 PRE-OPERATIVE CARDIOVASCULAR EXAMINATION: Primary | ICD-10-CM

## 2019-01-01 DIAGNOSIS — K75.81 LIVER CIRRHOSIS SECONDARY TO NASH (H): ICD-10-CM

## 2019-01-01 DIAGNOSIS — Z01.818 ENCOUNTER FOR PRE-TRANSPLANT EVALUATION FOR CHRONIC LIVER DISEASE: Primary | ICD-10-CM

## 2019-01-01 DIAGNOSIS — R91.1 LUNG NODULE: ICD-10-CM

## 2019-01-01 DIAGNOSIS — I48.20 CHRONIC ATRIAL FIBRILLATION (H): ICD-10-CM

## 2019-01-01 DIAGNOSIS — G30.9 ALZHEIMER'S DEMENTIA WITHOUT BEHAVIORAL DISTURBANCE, UNSPECIFIED TIMING OF DEMENTIA ONSET: Primary | ICD-10-CM

## 2019-01-01 DIAGNOSIS — G92.9 TOXIC ENCEPHALOPATHY: Primary | ICD-10-CM

## 2019-01-01 DIAGNOSIS — R41.3 MEMORY LOSS: Primary | ICD-10-CM

## 2019-01-01 DIAGNOSIS — Z76.82 AWAITING ORGAN TRANSPLANT STATUS: Primary | ICD-10-CM

## 2019-01-01 LAB
ABO + RH BLD: NORMAL
AFP SERPL-MCNC: 2.1 UG/L (ref 0–8)
ALBUMIN SERPL-MCNC: 2.7 G/DL (ref 3.4–5)
ALBUMIN UR-MCNC: NEGATIVE MG/DL
ALP SERPL-CCNC: 154 U/L (ref 40–150)
ALT SERPL W P-5'-P-CCNC: 50 U/L (ref 0–70)
ANION GAP SERPL CALCULATED.3IONS-SCNC: 7 MMOL/L (ref 3–14)
APPEARANCE UR: ABNORMAL
AST SERPL W P-5'-P-CCNC: 63 U/L (ref 0–45)
BILIRUB DIRECT SERPL-MCNC: 0.3 MG/DL (ref 0–0.2)
BILIRUB SERPL-MCNC: 0.6 MG/DL (ref 0.2–1.3)
BILIRUB UR QL STRIP: NEGATIVE
BLD GP AB SCN SERPL QL: NORMAL
BLD GP AB SCN TITR SERPL: NORMAL {TITER}
BLOOD BANK CMNT PATIENT-IMP: NORMAL
BUN SERPL-MCNC: 23 MG/DL (ref 7–30)
CALCIUM SERPL-MCNC: 8.4 MG/DL (ref 8.5–10.1)
CHLORIDE SERPL-SCNC: 106 MMOL/L (ref 94–109)
CHOLEST SERPL-MCNC: 95 MG/DL
CMV IGG SERPL QL IA: 5.1 AI (ref 0–0.8)
CO2 SERPL-SCNC: 27 MMOL/L (ref 20–32)
COLOR UR AUTO: YELLOW
CREAT SERPL-MCNC: 1.81 MG/DL (ref 0.66–1.25)
CREAT UR-MCNC: 208 MG/DL
DEPRECATED CALCIDIOL+CALCIFEROL SERPL-MC: 20 UG/L (ref 20–75)
EBV VCA IGG SER QL IA: >8 AI (ref 0–0.8)
ERYTHROCYTE [DISTWIDTH] IN BLOOD BY AUTOMATED COUNT: 14.5 % (ref 10–15)
ETHYL GLUCURONIDE UR QL: NEGATIVE
GAMMA INTERFERON BACKGROUND BLD IA-ACNC: 0.03 IU/ML
GFR SERPL CREATININE-BSD FRML MDRD: 38 ML/MIN/{1.73_M2}
GLUCOSE SERPL-MCNC: 99 MG/DL (ref 70–99)
GLUCOSE UR STRIP-MCNC: NEGATIVE MG/DL
HCT VFR BLD AUTO: 34.1 % (ref 40–53)
HDLC SERPL-MCNC: 43 MG/DL
HGB BLD-MCNC: 11 G/DL (ref 13.3–17.7)
HGB UR QL STRIP: NEGATIVE
HIV 1+2 AB+HIV1 P24 AG SERPL QL IA: NONREACTIVE
HYALINE CASTS #/AREA URNS LPF: 78 /LPF (ref 0–2)
INR PPP: 1.91 (ref 0.86–1.14)
INTERPRETATION ECG - MUSE: NORMAL
IRON SATN MFR SERPL: 78 % (ref 15–46)
IRON SERPL-MCNC: 108 UG/DL (ref 35–180)
KETONES UR STRIP-MCNC: 5 MG/DL
LDLC SERPL CALC-MCNC: 41 MG/DL
LEUKOCYTE ESTERASE UR QL STRIP: NEGATIVE
M TB IFN-G BLD-IMP: NEGATIVE
M TB IFN-G CD4+ BCKGRND COR BLD-ACNC: 6.63 IU/ML
MCH RBC QN AUTO: 32 PG (ref 26.5–33)
MCHC RBC AUTO-ENTMCNC: 32.3 G/DL (ref 31.5–36.5)
MCV RBC AUTO: 99 FL (ref 78–100)
MITOGEN IGNF BCKGRD COR BLD-ACNC: 0 IU/ML
MITOGEN IGNF BCKGRD COR BLD-ACNC: 0 IU/ML
MUCOUS THREADS #/AREA URNS LPF: PRESENT /LPF
NITRATE UR QL: NEGATIVE
NONHDLC SERPL-MCNC: 52 MG/DL
PH UR STRIP: 5 PH (ref 5–7)
PHOSPHATE SERPL-MCNC: 3.8 MG/DL (ref 2.5–4.5)
PLATELET # BLD AUTO: 100 10E9/L (ref 150–450)
POTASSIUM SERPL-SCNC: 4.1 MMOL/L (ref 3.4–5.3)
PROT SERPL-MCNC: 7.4 G/DL (ref 6.8–8.8)
PROT UR-MCNC: 0.2 G/L
PROT/CREAT 24H UR: 0.09 G/G CR (ref 0–0.2)
PSA SERPL-ACNC: 0.18 UG/L (ref 0–4)
RADIOLOGIST FLAGS: NORMAL
RBC # BLD AUTO: 3.44 10E12/L (ref 4.4–5.9)
RBC #/AREA URNS AUTO: <1 /HPF (ref 0–2)
SODIUM SERPL-SCNC: 140 MMOL/L (ref 133–144)
SOURCE: ABNORMAL
SP GR UR STRIP: 1.02 (ref 1–1.03)
SPECIMEN EXP DATE BLD: NORMAL
SPECIMEN EXP DATE BLD: NORMAL
SQUAMOUS #/AREA URNS AUTO: <1 /HPF (ref 0–1)
T PALLIDUM AB SER QL: NONREACTIVE
T4 FREE SERPL-MCNC: 0.85 NG/DL (ref 0.76–1.46)
TIBC SERPL-MCNC: 139 UG/DL (ref 240–430)
TRANSFERRIN SERPL-MCNC: 114 MG/DL (ref 210–360)
TRIGL SERPL-MCNC: 56 MG/DL
TSH SERPL DL<=0.005 MIU/L-ACNC: 5.96 MU/L (ref 0.4–4)
UROBILINOGEN UR STRIP-MCNC: 0 MG/DL (ref 0–2)
WBC # BLD AUTO: 3.3 10E9/L (ref 4–11)
WBC #/AREA URNS AUTO: 2 /HPF (ref 0–5)

## 2019-01-01 PROCEDURE — 93018 CV STRESS TEST I&R ONLY: CPT | Performed by: INTERNAL MEDICINE

## 2019-01-01 PROCEDURE — 82105 ALPHA-FETOPROTEIN SERUM: CPT | Performed by: INTERNAL MEDICINE

## 2019-01-01 PROCEDURE — 86901 BLOOD TYPING SEROLOGIC RH(D): CPT | Performed by: INTERNAL MEDICINE

## 2019-01-01 PROCEDURE — 93308 TTE F-UP OR LMTD: CPT | Mod: 26 | Performed by: INTERNAL MEDICINE

## 2019-01-01 PROCEDURE — 82306 VITAMIN D 25 HYDROXY: CPT | Performed by: INTERNAL MEDICINE

## 2019-01-01 PROCEDURE — 40000866 ZZHCL STATISTIC HIV 1/2 ANTIGEN/ANTIBODY PRETRANSPLANT ONLY: Performed by: INTERNAL MEDICINE

## 2019-01-01 PROCEDURE — 93321 DOPPLER ECHO F-UP/LMTD STD: CPT | Mod: 26 | Performed by: INTERNAL MEDICINE

## 2019-01-01 PROCEDURE — G0103 PSA SCREENING: HCPCS | Performed by: INTERNAL MEDICINE

## 2019-01-01 PROCEDURE — 84439 ASSAY OF FREE THYROXINE: CPT | Performed by: INTERNAL MEDICINE

## 2019-01-01 PROCEDURE — 80048 BASIC METABOLIC PNL TOTAL CA: CPT | Performed by: INTERNAL MEDICINE

## 2019-01-01 PROCEDURE — 83550 IRON BINDING TEST: CPT | Performed by: INTERNAL MEDICINE

## 2019-01-01 PROCEDURE — 86644 CMV ANTIBODY: CPT | Performed by: INTERNAL MEDICINE

## 2019-01-01 PROCEDURE — 84100 ASSAY OF PHOSPHORUS: CPT | Performed by: INTERNAL MEDICINE

## 2019-01-01 PROCEDURE — 40000141 ZZH STATISTIC PERIPHERAL IV START W/O US GUIDANCE

## 2019-01-01 PROCEDURE — 34300033 ZZH RX 343: Performed by: INTERNAL MEDICINE

## 2019-01-01 PROCEDURE — 78452 HT MUSCLE IMAGE SPECT MULT: CPT | Mod: 26 | Performed by: INTERNAL MEDICINE

## 2019-01-01 PROCEDURE — 93308 TTE F-UP OR LMTD: CPT

## 2019-01-01 PROCEDURE — 83540 ASSAY OF IRON: CPT | Performed by: INTERNAL MEDICINE

## 2019-01-01 PROCEDURE — 86665 EPSTEIN-BARR CAPSID VCA: CPT | Performed by: INTERNAL MEDICINE

## 2019-01-01 PROCEDURE — 80061 LIPID PANEL: CPT | Performed by: INTERNAL MEDICINE

## 2019-01-01 PROCEDURE — 80307 DRUG TEST PRSMV CHEM ANLYZR: CPT | Performed by: INTERNAL MEDICINE

## 2019-01-01 PROCEDURE — 81001 URINALYSIS AUTO W/SCOPE: CPT | Mod: XU | Performed by: INTERNAL MEDICINE

## 2019-01-01 PROCEDURE — 0064U ANTB TP TOTAL&RPR IA QUAL: CPT | Performed by: INTERNAL MEDICINE

## 2019-01-01 PROCEDURE — 85610 PROTHROMBIN TIME: CPT | Performed by: INTERNAL MEDICINE

## 2019-01-01 PROCEDURE — 86850 RBC ANTIBODY SCREEN: CPT | Performed by: INTERNAL MEDICINE

## 2019-01-01 PROCEDURE — 25000128 H RX IP 250 OP 636: Performed by: INTERNAL MEDICINE

## 2019-01-01 PROCEDURE — 36415 COLL VENOUS BLD VENIPUNCTURE: CPT | Performed by: INTERNAL MEDICINE

## 2019-01-01 PROCEDURE — G0463 HOSPITAL OUTPT CLINIC VISIT: HCPCS | Mod: ZF

## 2019-01-01 PROCEDURE — 93016 CV STRESS TEST SUPVJ ONLY: CPT | Performed by: INTERNAL MEDICINE

## 2019-01-01 PROCEDURE — 93325 DOPPLER ECHO COLOR FLOW MAPG: CPT | Mod: 26 | Performed by: INTERNAL MEDICINE

## 2019-01-01 PROCEDURE — 93017 CV STRESS TEST TRACING ONLY: CPT

## 2019-01-01 PROCEDURE — 78452 HT MUSCLE IMAGE SPECT MULT: CPT

## 2019-01-01 PROCEDURE — 84443 ASSAY THYROID STIM HORMONE: CPT | Performed by: INTERNAL MEDICINE

## 2019-01-01 PROCEDURE — 86480 TB TEST CELL IMMUN MEASURE: CPT | Performed by: INTERNAL MEDICINE

## 2019-01-01 PROCEDURE — 86900 BLOOD TYPING SEROLOGIC ABO: CPT | Mod: 91 | Performed by: INTERNAL MEDICINE

## 2019-01-01 PROCEDURE — 86886 COOMBS TEST INDIRECT TITER: CPT | Performed by: INTERNAL MEDICINE

## 2019-01-01 PROCEDURE — 84466 ASSAY OF TRANSFERRIN: CPT | Performed by: INTERNAL MEDICINE

## 2019-01-01 PROCEDURE — A9502 TC99M TETROFOSMIN: HCPCS | Performed by: INTERNAL MEDICINE

## 2019-01-01 PROCEDURE — 85027 COMPLETE CBC AUTOMATED: CPT | Performed by: INTERNAL MEDICINE

## 2019-01-01 PROCEDURE — 84156 ASSAY OF PROTEIN URINE: CPT | Mod: XU | Performed by: INTERNAL MEDICINE

## 2019-01-01 PROCEDURE — 99204 OFFICE O/P NEW MOD 45 MIN: CPT | Mod: ZP | Performed by: INTERNAL MEDICINE

## 2019-01-01 PROCEDURE — 80076 HEPATIC FUNCTION PANEL: CPT | Performed by: INTERNAL MEDICINE

## 2019-01-01 RX ORDER — CHOLECALCIFEROL (VITAMIN D3) 50 MCG
2000 TABLET ORAL
COMMUNITY

## 2019-01-01 RX ORDER — LACTULOSE 10 G/15ML
SOLUTION ORAL
Refills: 3 | COMMUNITY
Start: 2019-01-01

## 2019-01-01 RX ORDER — ATROPINE SULFATE 0.4 MG/ML
.2-2 AMPUL (ML) INJECTION
Status: DISCONTINUED | OUTPATIENT
Start: 2019-01-01 | End: 2019-01-01 | Stop reason: HOSPADM

## 2019-01-01 RX ORDER — REGADENOSON 0.08 MG/ML
0.4 INJECTION, SOLUTION INTRAVENOUS ONCE
Status: COMPLETED | OUTPATIENT
Start: 2019-01-01 | End: 2019-01-01

## 2019-01-01 RX ORDER — MULTIVIT-MIN/IRON/FOLIC ACID/K 18-600-40
CAPSULE ORAL
COMMUNITY

## 2019-01-01 RX ORDER — ATORVASTATIN CALCIUM 10 MG/1
TABLET, FILM COATED ORAL
Refills: 4 | COMMUNITY
Start: 2018-01-01

## 2019-01-01 RX ORDER — FUROSEMIDE 40 MG
TABLET ORAL
Refills: 0 | COMMUNITY
Start: 2018-01-01

## 2019-01-01 RX ORDER — MULTIVIT-MIN/FOLIC/VIT K/LYCOP 400-300MCG
1 TABLET ORAL
COMMUNITY

## 2019-01-01 RX ORDER — POTASSIUM CHLORIDE 1500 MG/1
TABLET, EXTENDED RELEASE ORAL
Refills: 0 | COMMUNITY
Start: 2018-01-01

## 2019-01-01 RX ORDER — LACTULOSE 10 G/15ML
20 SOLUTION ORAL
COMMUNITY
Start: 2019-01-01

## 2019-01-01 RX ORDER — SODIUM CHLORIDE 9 MG/ML
INJECTION, SOLUTION INTRAVENOUS CONTINUOUS
Status: ACTIVE | OUTPATIENT
Start: 2019-01-01 | End: 2019-01-01

## 2019-01-01 RX ORDER — DOBUTAMINE HYDROCHLORIDE 200 MG/100ML
10-50 INJECTION INTRAVENOUS CONTINUOUS
Status: ACTIVE | OUTPATIENT
Start: 2019-01-01 | End: 2019-01-01

## 2019-01-01 RX ORDER — ESCITALOPRAM OXALATE 10 MG/1
15 TABLET ORAL
COMMUNITY
Start: 2018-01-01

## 2019-01-01 RX ORDER — DONEPEZIL HYDROCHLORIDE 10 MG/1
10 TABLET, FILM COATED ORAL
COMMUNITY

## 2019-01-01 RX ORDER — CHOLECALCIFEROL (VITAMIN D3) 25 MCG
TABLET ORAL
Refills: 99 | COMMUNITY
Start: 2018-01-01

## 2019-01-01 RX ORDER — LOSARTAN POTASSIUM 25 MG/1
25 TABLET ORAL DAILY
Refills: 3 | COMMUNITY
Start: 2018-01-01

## 2019-01-01 RX ORDER — LANOLIN ALCOHOL/MO/W.PET/CERES
CREAM (GRAM) TOPICAL
Refills: 0 | COMMUNITY
Start: 2018-01-01

## 2019-01-01 RX ORDER — SUCRALFATE 1 G/1
TABLET ORAL
COMMUNITY
Start: 2019-01-01

## 2019-01-01 RX ORDER — SPIRONOLACTONE 100 MG/1
100 TABLET, FILM COATED ORAL DAILY
Refills: 3 | COMMUNITY
Start: 2019-01-01

## 2019-01-01 RX ORDER — METOPROLOL TARTRATE 25 MG/1
12.5 TABLET, FILM COATED ORAL
COMMUNITY
Start: 2018-01-04

## 2019-01-01 RX ORDER — METOPROLOL TARTRATE 1 MG/ML
1-20 INJECTION, SOLUTION INTRAVENOUS
Status: ACTIVE | OUTPATIENT
Start: 2019-01-01 | End: 2019-01-01

## 2019-01-01 RX ORDER — ESCITALOPRAM OXALATE 10 MG/1
TABLET ORAL
Refills: 6 | COMMUNITY
Start: 2019-01-01

## 2019-01-01 RX ORDER — DONEPEZIL HYDROCHLORIDE 10 MG/1
TABLET, FILM COATED ORAL
Refills: 99 | COMMUNITY
Start: 2018-01-01

## 2019-01-01 RX ORDER — APIXABAN 5 MG/1
TABLET, FILM COATED ORAL
Refills: 99 | COMMUNITY
Start: 2018-01-01

## 2019-01-01 RX ORDER — ALBUTEROL SULFATE 90 UG/1
2 AEROSOL, METERED RESPIRATORY (INHALATION) EVERY 5 MIN PRN
Status: DISCONTINUED | OUTPATIENT
Start: 2019-01-01 | End: 2019-01-01 | Stop reason: HOSPADM

## 2019-01-01 RX ORDER — ACYCLOVIR 200 MG/1
0-1 CAPSULE ORAL
Status: DISCONTINUED | OUTPATIENT
Start: 2019-01-01 | End: 2019-01-01 | Stop reason: HOSPADM

## 2019-01-01 RX ORDER — ATORVASTATIN CALCIUM 10 MG/1
10 TABLET, FILM COATED ORAL
COMMUNITY

## 2019-01-01 RX ORDER — SUCRALFATE 1 G/1
1 TABLET ORAL
COMMUNITY
Start: 2019-01-01

## 2019-01-01 RX ADMIN — TETROFOSMIN 42 MCI.: 1.38 INJECTION, POWDER, LYOPHILIZED, FOR SOLUTION INTRAVENOUS at 14:07

## 2019-01-01 RX ADMIN — REGADENOSON 0.4 MG: 0.08 INJECTION, SOLUTION INTRAVENOUS at 14:03

## 2019-01-01 RX ADMIN — TETROFOSMIN 11 MCI.: 1.38 INJECTION, POWDER, LYOPHILIZED, FOR SOLUTION INTRAVENOUS at 13:35

## 2019-01-01 SDOH — HEALTH STABILITY: MENTAL HEALTH: HOW OFTEN DO YOU HAVE A DRINK CONTAINING ALCOHOL?: NEVER

## 2019-01-01 ASSESSMENT — ENCOUNTER SYMPTOMS
STIFFNESS: 0
FATIGUE: 1
POLYDIPSIA: 0
INCREASED ENERGY: 0
DECREASED APPETITE: 0
WEAKNESS: 0
ORTHOPNEA: 0
NIGHT SWEATS: 0
POLYPHAGIA: 0
SLEEP DISTURBANCES DUE TO BREATHING: 0
TREMORS: 0
WEIGHT GAIN: 0
NERVOUS/ANXIOUS: 0
SPEECH CHANGE: 0
INSOMNIA: 0
HYPERTENSION: 0
ALTERED TEMPERATURE REGULATION: 1
SEIZURES: 0
BACK PAIN: 0
LOSS OF CONSCIOUSNESS: 0
EXERCISE INTOLERANCE: 1
DECREASED CONCENTRATION: 1
TINGLING: 0
POOR WOUND HEALING: 0
SYNCOPE: 0
PALPITATIONS: 0
NIGHT SWEATS: 0
LEG PAIN: 0
SPEECH CHANGE: 0
HALLUCINATIONS: 0
ARTHRALGIAS: 0
TREMORS: 0
DIZZINESS: 0
TINGLING: 0
MEMORY LOSS: 1
PANIC: 0
NUMBNESS: 0
INCREASED ENERGY: 1
JOINT SWELLING: 0
NAIL CHANGES: 0
DISTURBANCES IN COORDINATION: 0
DECREASED APPETITE: 0
NECK PAIN: 0
SEIZURES: 0
LIGHT-HEADEDNESS: 0
ALTERED TEMPERATURE REGULATION: 1
MUSCLE WEAKNESS: 1
PARALYSIS: 0
HYPOTENSION: 1
DIZZINESS: 1
WEAKNESS: 1
PARALYSIS: 0
SKIN CHANGES: 0
DISTURBANCES IN COORDINATION: 0
HALLUCINATIONS: 0
WEIGHT LOSS: 0
FEVER: 0
MEMORY LOSS: 1
CHILLS: 0
HEADACHES: 1
POLYDIPSIA: 0
LOSS OF CONSCIOUSNESS: 0
FEVER: 0
DEPRESSION: 0
FATIGUE: 1
WEIGHT LOSS: 1
POLYPHAGIA: 0
MUSCLE CRAMPS: 0
CHILLS: 0
HEADACHES: 0
NUMBNESS: 0
MYALGIAS: 0
WEIGHT GAIN: 0

## 2019-01-01 ASSESSMENT — MIFFLIN-ST. JEOR
SCORE: 1617.68
SCORE: 1615.87
SCORE: 1792.77
SCORE: 1648.19

## 2019-01-01 ASSESSMENT — PAIN SCALES - GENERAL
PAINLEVEL: NO PAIN (0)
PAINLEVEL: NO PAIN (0)

## 2019-01-07 NOTE — LETTER
2019    Gilmar Turk  [address] [place] [address]      Dear Gilmar,    Thank you for your interest in the Transplant Center at [facility], [facility]. We look forward to being a part of your care team and assisting you through the transplant process.    As we discussed, your transplant coordinator is Yuri Eddy Jr. (Liver).  You may call your coordinator at any time with questions or concerns, call [phone].    Please complete the followin. Fill out and return the enclosed forms    Authorization for Electronic Communication    Authorization to Discuss Protected Health Information    Authorization for Release of Protected Health Information    Authorization for Care Everywhere Release of Information    2. Sign up for:    [employer], access to your electronic medical record (see enclosed pamphlet)    [email or web address], a transplant education website    You can use these tools to learn more about your transplant, communicate with your care team, and track your medical details      Sincerely,      Solid Organ Transplant  [facility], [facility]    cc: Referring Physician PCP

## 2019-01-07 NOTE — TELEPHONE ENCOUNTER
Below is referral note from Dr. Dana Nicolas who is sending over patient from  for liver txp eval:    Please see late paragraph and make sure to call daughter.     Hi, I hope all of you had a good holiday! 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 Also with CKD since September. He is a complex alex, but barriers I foresee for transplant are his A.fib (on Eliquis) so needs cards clearance, and Alzheimers which  has gotten better so they re questioning the diagnosis  and a history of stroke so definitely needs neuro clearance. He does use a cane/walker. He has some muscle wasting but is working on that. I have him on a twice weekly tap schedule, I ve stopped the Lasix his PCP gave as his Cr has been bad since September, urgently working on getting him to nephrology here. I am scheduling screening EGD as well. I m not putting him through colonoscopy yet as I m not feeling strongly that he will be a good transplant candidate given the abovementioned issues. My note is detailed and outlines everything since last year so refer to that for more details. I am happy to do whatever testing is necessary through our [facility] system given that his daughter works with us and can get it scheduled quick. I just didn t do all of the transplant workup just yet.    Finally, Dr. Marks, this is Dee our  s father. She did request you specifically so I put that in my order. For Mauricio and Cheri, Dee is one of our GI schedulers here. She is on the chart as POA and please call her as the first person (given the Alzheimer s etc). I know she will need to fill out appropriate POA paperwork with you guys as well but wanted to give you a heads up. Her name is Dee Marinelli, cell [phone].

## 2019-01-08 NOTE — TELEPHONE ENCOUNTER
Patient was asked the following questions during liver intake call.     Referring Provider: Ninfa Nicolas MD  Referring Diagnosis: VOGEL  PCP: Leyda Cespedes MD     1)Do you know why you have liver disease: Yes             If Alcoholic Cirrhosis is present when was your last drink: NA             Have you ever been through treatment for alcohol: NA  2) Presence of Ascites: Yes Paracentesis: Yes  3) Presence of Hepatic Encephalopathy: Yes Medications: No  4) History of GI Bleeding: No  5) EGD: No Where: NA Records: NA  6) Colonoscopy: No Where NA RecordsNA  7) MELD Score: 19  8) Insurance information:Medicare      Policy urbina:Self      Subscriber/policy/ID number: [id number]      Group Number:     Referral intake process completed.  Patient is aware that after financial approval is received, medical records will be requested.   Patient confirmed for a callback from transplant coordinator on 1/16/2019.  Tentative evaluation date TBD.  Confirmed coordinator will discuss evaluation process in more detail at the time of their call.   Patient is aware of the need to arrange age appropriate cancer screening, vaccinations, and dental care.  Reminded patient to complete questionnaire, complete medical records release, and review packet prior to evaluation visit .  Assessed patient for special needs (ie--wheelchair, assistance, guardian, and ):  No   Patient instructed to call [phone] with questions.

## 2019-01-16 PROBLEM — I48.91 ATRIAL FIBRILLATION (H): Status: ACTIVE | Noted: 2019-01-01

## 2019-01-16 PROBLEM — K74.60 LIVER CIRRHOSIS SECONDARY TO NASH (H): Status: ACTIVE | Noted: 2019-01-01

## 2019-01-16 PROBLEM — G51.0 BELL'S PALSY: Status: ACTIVE | Noted: 2019-01-01

## 2019-01-16 PROBLEM — R18.8 OTHER ASCITES: Status: ACTIVE | Noted: 2019-01-01

## 2019-01-16 PROBLEM — K75.81 LIVER CIRRHOSIS SECONDARY TO NASH (H): Status: ACTIVE | Noted: 2019-01-01

## 2019-01-16 NOTE — TELEPHONE ENCOUNTER
69 y/o with VOGEL cirrhosis referred by Dr. Nicolas at . See CE for detailed report from her, labs, and imaging at both  and [facility].  Started November 2018 - toe amputation 8/27/18 due to gangrene that seems to have tipped over the liver. Patient may have been having symptoms for last couple years though.    Social drinker of alcohol most of adult life, none recently  Would drink at least a case Pepsi per week    MELD 19      Kids - son and daughter    Ascites - yes  Taps - bi-weekly  HE - on meds  GIBS - no  EGD - 1/14/19 in [facility]  Colonoscopy - due (had approx 20 yrs ago)    Head - none confirmed  Heart - A.Fib  Lung - smoker (cigar once a month approx) stopped last summer  Kidney - hit with decompensation and stable at best (Cr 1.3-1.5)  Cancer - no    metoprolol tartrate (LOPRESSOR) 25 MG tablet         Plan as discussed with daughter: full evaluation with Dr. Marks on 2/12

## 2019-02-11 NOTE — LETTER
Date:February 19, 2019      Provider requested that no letter be sent. Do not send.       [facility] Health Information

## 2019-02-11 NOTE — LETTER
2/11/2019       RE: Gilmar Turk  [address] [facility] [place] [address]     Dear Colleague,    Thank you for referring your patient, Gilmar Turk, to the [facility] SOLID ORGAN TRANSPLANT at [facility]. Please see a copy of my visit note below.    Liver Transplant Evaluation/Teaching    TEACHING TOPICS: Evaluation Process, Evaluation Items, Diagnostic Studies, Consultation, Chemical Dependency Policy, CD Eval, Donor Source, Liver Allocation, MELD System, UNOS, Waiting List, Follow up while on transplant list, Follow up after transplantation, Infection and Rejection, Immunosuppression , Medication Teaching, Lab Recording after transplant, Laboratory Frequency after transplant , Consent for evaluation and One year survival rates  INSTRUCTIONAL MATERIAL USED/GIVEN: Liver Transplant Handbook, MELD Booklet, Donor Booklet, Web Sites Options, Verbal instructions, Multiple Listing Brochure , Consent for evaluation signed, One year survival rates and SRTR (Scientific Registry) Data  Person(s) involved in teaching: patient, his wife, his daughter and family  Asks Questions: YES  Eager to Learn: YES  Cooperative: YES  Receptive (willing/able to accept information): YES  Reason for the appointment, diagnosis and treatment plan:YES  Knowledge of proper use of medications and conditions for which they are ordered (with special attention to potential side effects or drug interactions): YES  Which situations necessitate calling provider and whom to contact:YES  Other: NA  Teaching Concerns Addressed   Comments: Patient, his wife, his daughter and family attended transplant class. Asked excellent questions. Eval consent signed.   Proper use and care of (medical equip, care aids, etc.):YES  Nutritional needs and diet plan: YES  Pain management techniques: YES  Wound Care: YES  How and/when to access community resources: YES  Patient is aware of and agrees to required commitment to post-op  care and long term follow-up: YES  Patient has name and phone number of transplant coordinator.   Time Spent face-to-face teachin minutes.  Cheri Garcia RN  Liver Transplant Coordinator                 Again, thank you for allowing me to participate in the care of your patient.      Sincerely,    Transplant Class

## 2019-02-12 PROBLEM — L02.31 ABSCESS OF BUTTOCK: Status: ACTIVE | Noted: 2019-01-01

## 2019-02-12 PROBLEM — A41.89 GRAM POSITIVE SEPSIS (H): Status: ACTIVE | Noted: 2019-01-01

## 2019-02-12 PROBLEM — K74.60 ESOPHAGEAL VARICES IN CIRRHOSIS (H): Status: ACTIVE | Noted: 2019-01-01

## 2019-02-12 PROBLEM — E87.20 LACTIC ACID ACIDOSIS: Status: ACTIVE | Noted: 2018-01-01

## 2019-02-12 PROBLEM — L08.9 DIABETIC FOOT INFECTION (H): Status: ACTIVE | Noted: 2019-01-01

## 2019-02-12 PROBLEM — N17.9 AKI (ACUTE KIDNEY INJURY) (H): Status: ACTIVE | Noted: 2018-01-01

## 2019-02-12 PROBLEM — D72.829 LEUKOCYTOSIS, UNSPECIFIED TYPE: Status: ACTIVE | Noted: 2018-01-01

## 2019-02-12 PROBLEM — B95.62 MRSA BACTEREMIA: Status: ACTIVE | Noted: 2019-01-01

## 2019-02-12 PROBLEM — F02.80 ALZHEIMER'S DEMENTIA WITHOUT BEHAVIORAL DISTURBANCE, UNSPECIFIED TIMING OF DEMENTIA ONSET: Status: ACTIVE | Noted: 2019-01-01

## 2019-02-12 PROBLEM — M86.9 OSTEOMYELITIS OF FOOT (H): Status: ACTIVE | Noted: 2018-01-01

## 2019-02-12 PROBLEM — Z86.79 HISTORY OF ATRIAL FIBRILLATION: Status: ACTIVE | Noted: 2019-01-01

## 2019-02-12 PROBLEM — M62.81 GENERALIZED MUSCLE WEAKNESS: Status: ACTIVE | Noted: 2019-01-01

## 2019-02-12 PROBLEM — E11.628 DIABETIC FOOT INFECTION (H): Status: ACTIVE | Noted: 2019-01-01

## 2019-02-12 PROBLEM — F32.A DEPRESSION: Status: ACTIVE | Noted: 2019-01-01

## 2019-02-12 PROBLEM — R46.89 COGNITIVE AND BEHAVIORAL CHANGES: Status: ACTIVE | Noted: 2019-01-01

## 2019-02-12 PROBLEM — G93.40 ENCEPHALOPATHY: Status: ACTIVE | Noted: 2019-01-01

## 2019-02-12 PROBLEM — G47.9 SLEEP DIFFICULTIES: Status: ACTIVE | Noted: 2019-01-01

## 2019-02-12 PROBLEM — Z87.39 HISTORY OF OSTEOMYELITIS: Status: ACTIVE | Noted: 2019-01-01

## 2019-02-12 PROBLEM — F10.10 ALCOHOL ABUSE: Status: ACTIVE | Noted: 2018-04-09

## 2019-02-12 PROBLEM — G30.9 ALZHEIMER'S DEMENTIA WITHOUT BEHAVIORAL DISTURBANCE, UNSPECIFIED TIMING OF DEMENTIA ONSET: Status: ACTIVE | Noted: 2019-01-01

## 2019-02-12 PROBLEM — K75.81 NASH (NONALCOHOLIC STEATOHEPATITIS): Status: ACTIVE | Noted: 2019-01-01

## 2019-02-12 PROBLEM — I85.10 ESOPHAGEAL VARICES IN CIRRHOSIS (H): Status: ACTIVE | Noted: 2019-01-01

## 2019-02-12 PROBLEM — R55 SYNCOPE: Status: ACTIVE | Noted: 2018-01-01

## 2019-02-12 PROBLEM — I96 GANGRENE OF TOE OF RIGHT FOOT (H): Status: ACTIVE | Noted: 2018-01-01

## 2019-02-12 PROBLEM — R41.89 COGNITIVE AND BEHAVIORAL CHANGES: Status: ACTIVE | Noted: 2019-01-01

## 2019-02-12 PROBLEM — R78.81 MRSA BACTEREMIA: Status: ACTIVE | Noted: 2019-01-01

## 2019-02-12 NOTE — PROGRESS NOTES
Assessment and Plan:  1. liver transplant evaluation - patient is a fair candidate overall. Benefits and surgical risks of a liver transplantation were discussed.  2.  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 Cardiac: Has history of a.fib  On elquis.  Questionable vegetation on  Needs cardiology work-up for ischemic cardiac disease and valvular/rhythm control   4. History of stroke:  Originally thought to be bells palsy, but MRI showed right frontal lesion.  Repeat MRI?  Possibly embolic.  5. Memory loss:  Questionable Alzheimers disease vs encephalopathy.  Needs neuropsych eval and neurology appt.  Recommend appt with with Kimberly   6. History of MRSA bacteremia: Follow-up with ID.   7.  Ascites:  Recommend paracentesis as needed   8.  History of alcohol abuse:  Has a history of a DWI and lost license.  Recommend CD eval.  Appreciate social work input..    9. Great toe amputation: sounds like poor shoe fit/infection, but should have LE doppler US to look for occlusive disease.  May also have been embolic.  Needs routine screening for infection and neoplasm    Surgical evaluation:  1. Portal Vein:Patent  2. Hepatic Artery: Open  3. TIPS: absent  4. Previous Abdominal Surgery: No  5. Hepatocellular Carcinoma: None  6. Ascites: Present - large amount  7. Costal Angle: wide  8. Portopulmonary Hypertension: absent  9. Hepatopulmonary Syndrome: absent  10. Cardiac Evaluation: needs heart catheterization  11. Nutritional Status: Moderate  12. Diabetes: Previously was borderline  13.Hypertension  Yes   14. Smoker: Yes. Smoker cigars   15: Fraility index: Yes              Patients overall evaluation will be discussed at the Liver Transplant selection committee meeting with a final recommendation on the patients suitability for transplant to be made at that time.      Kenyetta Jay, CNP   Consult Full  Details:  Gilmar Turk was seen in consultation at the request of Dr. Nicolas  for  evaluation as a potential liver transplant recipient.    HPI:   Gilmar Turk is a 68 year old year old male with nonalcoholic steatopheatitis, who presents for liver transplant evaluation.     Patient was diagnosed with liver disease in December 2018 at this time patient had a CT scan of his abdomen and was noted.  Cirrhosis of his liver.  Patient had originally presented to his primary care provider with increased abdominal distention, however this was found to be a large volume ascites.    Since his diagnosis patient has been getting weekly paracentesis he typically is tapped for around 4-5 L.      Patient denies any history of significant alcohol abuse however upon reviewing his chart it does state the patient drank a number of beers per day however family denies that this is true..    Patient has a history of memory impairment however according to patient's daughter this was likely due to hepatic encephalopathy as his Carson score did improve upon retest.    Patient does not answer many questions and relies primarily on his family.          Past Medical History:   Diagnosis Date     Atrial fibrillation (H) 1/16/2019     Bell's palsy 1/16/2019     HTN (hypertension)      Liver cirrhosis secondary to VOGEL (H) 1/16/2019     Other ascites 1/16/2019     Pre-diabetes      Past Surgical History:   Procedure Laterality Date     AMPUTATION      Great toe on right foot.      COLONOSCOPY      most likely at age 51 - due     Past Surgical History:   Procedure Laterality Date     AMPUTATION      Great toe on right foot.      COLONOSCOPY      most likely at age 51 - due     Family History   Problem Relation Age of Onset     Diabetes Type 2  Mother      Bronchitis Mother      Heart Failure Mother      Kidney failure Father      Diabetes Type 2  Father      No Known Allergies  Prior to Admission medications    Medication Sig Start Date End Date Taking? Authorizing Provider   apixaban ANTICOAGULANT (ELIQUIS) 5 MG tablet Take 5  mg by mouth    Reported, Patient   atorvastatin (LIPITOR) 10 MG tablet Take 10 mg by mouth    Reported, Patient   atorvastatin (LIPITOR) 10 MG tablet TAKE 1 TABLET BY MOUTH ONCE A DAY ORALLY 11/23/18   Reported, Patient   Cholecalciferol (VITAMIN D) 2000 units CAPS     Reported, Patient   DOCOSAHEXAENOIC ACID PO Take 2 g by mouth    Reported, Patient   donepezil (ARICEPT) 10 MG tablet Take 10 mg by mouth    Reported, Patient   donepezil (ARICEPT) 10 MG tablet TAKE 1 TAB BY MOUTH ONCE DAILY 9/20/18   Reported, Patient   ELIQUIS 5 MG tablet TAKE 1 TAB BY MOUTH TWICE A DAY 9/20/18   Reported, Patient   escitalopram (LEXAPRO) 10 MG tablet Take 15 mg by mouth 8/24/18   Reported, Patient   escitalopram (LEXAPRO) 10 MG tablet TAKE 1.5 TABLET BY MOUTH ONCE DAILY 2/6/19   Reported, Patient   furosemide (LASIX) 40 MG tablet TAKE ONE-HALF TO 1 TABLET BY MOUTH DAILY 12/21/18   Reported, Patient   lactulose (CHRONULAC) 10 GM/15ML solution Take 20 g by mouth 1/9/19   Reported, Patient   lactulose (CHRONULAC) 10 GM/15ML solution TAKE 30 ML BY MOUTH THREE TIMES A DAY. 1/9/19   Reported, Patient   losartan (COZAAR) 25 MG tablet Take 25 mg by mouth daily 6/25/18   Reported, Patient   melatonin 3 MG tablet TAKE 1 TAB BY MOUTH AT BEDTIME FOR SEVEN DAYS 9/20/18   Reported, Patient   melatonin 5 MG tablet     Reported, Patient   metoprolol tartrate (LOPRESSOR) 25 MG tablet 12.5 mg 1/4/18   Reported, Patient   Multiple Vitamins-Minerals (CENTRUM) TABS     Reported, Patient   Multiple Vitamins-Minerals ([employer] DAILY MULTIPLE FOR MEN) TABS Take 1 tablet by mouth    Reported, Patient   potassium chloride ER (K-DUR/KLOR-CON M) 20 MEQ CR tablet TAKE 1 TAB BY MOUTH NOW AND THEN TAKE ANOTHER IN 6 HOURS 10/1/18   Reported, Patient   rifaximin (XIFAXAN) 550 MG TABS tablet Take 550 mg by mouth 2/4/19   Reported, Patient   spironolactone (ALDACTONE) 100 MG tablet Take 100 mg by mouth daily 1/11/19   Reported, Patient   sucralfate (CARAFATE) 1 GM tablet   2/10/19   Reported, Patient   sucralfate (CARAFATE) 1 GM tablet Take 1 g by mouth 1/15/19   Reported, Patient   vitamin D3 (CHOLECALCIFEROL) 2000 units tablet Take 2,000 Units by mouth    Reported, Patient   VITAMIN D3 1000 units tablet TAKE 2 TABS (2000UNITS) BY MOUTH ONCE DAILY 9/20/18   Reported, Patient       Previous Transplant Hx: No    Cardiovascular Hx:       h/o Cardiac Issues: No       Exercise Tolerance: shortness of breath with exertion.    Potential Donor(s): Yes -  [facility]    ROS:    REVIEW OF SYSTEMS (check box if normal)  [x]                GENERAL  [x]                  PULMONARY [x]                 GENITOURINARY  [x]                 CNS                 [x]                  CARDIAC  [x]                  ENDOCRINE  [x]                 EARS,NOSE,THROAT [x]                  GASTROINTESTINAL [x]                  NEUROLOGIC    [x]                 MUSCLOSKELTAL  [x]                   HEMATOLOGY    Examination:     Vitals:  There were no vitals taken for this visit.    GENERAL APPEARANCE: alert and no distress  EYES: PERRL  HENT: mouth without ulcers or lesions  NECK: supple, no adenopathy  RESP: lungs clear to auscultation - no rales, rhonchi or wheezes  CV: regular rhythm, normal rate, no rub   ABDOMEN:  soft, nontender, no HSM or masses and bowel sounds normal. Large volume ascites   MS: extremities normal- no gross deformities noted, no evidence of inflammation in joints, no muscle tenderness  SKIN: no rash  NEURO: Normal strength and tone, sensory exam grossly normal, mentation intact and speech normal  PSYCH: mentation appears normal. and affect normal/bright      Results:   Recent Results (from the past 168 hour(s))   X-ray Chest 2 vws [IMG36]    Collection Time: 02/11/19  7:07 AM   Result Value Ref Range    Radiologist flags Lung nodule    Treponema Abs w Reflex to RPR and Titer    Collection Time: 02/11/19  7:14 AM   Result Value Ref Range    Treponema Antibodies  Nonreactive NR^Nonreactive   HIV Antigen Antibody Combo Pretransplant    Collection Time: 02/11/19  7:14 AM   Result Value Ref Range    HIV Antigen Antibody Combo Pretransplant Nonreactive NR^Nonreactive   EBV Capsid Antibody IgG    Collection Time: 02/11/19  7:14 AM   Result Value Ref Range    EBV Capsid Antibody IgG >8.0 (H) 0.0 - 0.8 AI   CMV Antibody IgG    Collection Time: 02/11/19  7:14 AM   Result Value Ref Range    CMV Antibody IgG 5.1 (H) 0.0 - 0.8 AI   CBC with platelets    Collection Time: 02/11/19  7:14 AM   Result Value Ref Range    WBC 3.3 (L) 4.0 - 11.0 10e9/L    RBC Count 3.44 (L) 4.4 - 5.9 10e12/L    Hemoglobin 11.0 (L) 13.3 - 17.7 g/dL    Hematocrit 34.1 (L) 40.0 - 53.0 %    MCV 99 78 - 100 fl    MCH 32.0 26.5 - 33.0 pg    MCHC 32.3 31.5 - 36.5 g/dL    RDW 14.5 10.0 - 15.0 %    Platelet Count 100 (L) 150 - 450 10e9/L   INR    Collection Time: 02/11/19  7:14 AM   Result Value Ref Range    INR 1.91 (H) 0.86 - 1.14   Vitamin D Deficiency    Collection Time: 02/11/19  7:14 AM   Result Value Ref Range    Vitamin D Deficiency screening 20 20 - 75 ug/L   Transferrin    Collection Time: 02/11/19  7:14 AM   Result Value Ref Range    Transferrin 114 (L) 210 - 360 mg/dL   TSH with free T4 reflex    Collection Time: 02/11/19  7:14 AM   Result Value Ref Range    TSH 5.96 (H) 0.40 - 4.00 mU/L   Prostate spec antigen screen    Collection Time: 02/11/19  7:14 AM   Result Value Ref Range    PSA 0.18 0 - 4 ug/L   Phosphorus    Collection Time: 02/11/19  7:14 AM   Result Value Ref Range    Phosphorus 3.8 2.5 - 4.5 mg/dL   Iron and iron binding capacity    Collection Time: 02/11/19  7:14 AM   Result Value Ref Range    Iron 108 35 - 180 ug/dL    Iron Binding Cap 139 (L) 240 - 430 ug/dL    Iron Saturation Index 78 (H) 15 - 46 %   AFP tumor marker    Collection Time: 02/11/19  7:14 AM   Result Value Ref Range    Alpha Fetoprotein 2.1 0 - 8 ug/L   Hepatic panel    Collection Time: 02/11/19  7:14 AM   Result Value Ref  Range    Bilirubin Direct 0.3 (H) 0.0 - 0.2 mg/dL    Bilirubin Total 0.6 0.2 - 1.3 mg/dL    Albumin 2.7 (L) 3.4 - 5.0 g/dL    Protein Total 7.4 6.8 - 8.8 g/dL    Alkaline Phosphatase 154 (H) 40 - 150 U/L    ALT 50 0 - 70 U/L    AST 63 (H) 0 - 45 U/L   Basic metabolic panel    Collection Time: 02/11/19  7:14 AM   Result Value Ref Range    Sodium 140 133 - 144 mmol/L    Potassium 4.1 3.4 - 5.3 mmol/L    Chloride 106 94 - 109 mmol/L    Carbon Dioxide 27 20 - 32 mmol/L    Anion Gap 7 3 - 14 mmol/L    Glucose 99 70 - 99 mg/dL    Urea Nitrogen 23 7 - 30 mg/dL    Creatinine 1.81 (H) 0.66 - 1.25 mg/dL    GFR Estimate 38 (L) >60 mL/min/[1.73_m2]    GFR Estimate If Black 44 (L) >60 mL/min/[1.73_m2]    Calcium 8.4 (L) 8.5 - 10.1 mg/dL   Lipid Profile    Collection Time: 02/11/19  7:14 AM   Result Value Ref Range    Cholesterol 95 <200 mg/dL    Triglycerides 56 <150 mg/dL    HDL Cholesterol 43 >39 mg/dL    LDL Cholesterol Calculated 41 <100 mg/dL    Non HDL Cholesterol 52 <130 mg/dL   Antibody titer red cell    Collection Time: 02/11/19  7:14 AM   Result Value Ref Range    Antibody Titer Anti A: IgG >256  Anti B: IgM 64, IgG 128      ABO/Rh type and screen    Collection Time: 02/11/19  7:14 AM   Result Value Ref Range    ABO O     RH(D) Pos     Antibody Screen Neg     Test Valid Only At          [facility]    Specimen Expires 02/14/2019    T4 free    Collection Time: 02/11/19  7:14 AM   Result Value Ref Range    T4 Free 0.85 0.76 - 1.46 ng/dL   ABO type [QGF2410]    Collection Time: 02/11/19  7:24 AM   Result Value Ref Range    ABO O     RH(D) Pos     Specimen Expires 02/14/2019    EKG 12-lead, tracing only [EKG1]    Collection Time: 02/11/19  8:15 AM   Result Value Ref Range    Interpretation ECG Click View Image link to view waveform and result    Protein  random urine with Creat Ratio    Collection Time: 02/11/19  1:09 PM   Result Value Ref Range    Protein Random Urine 0.20 g/L     Protein Total Urine g/gr Creatinine 0.09 0 - 0.2 g/g Cr   UA reflex to Microscopic and Culture    Collection Time: 02/11/19  1:09 PM   Result Value Ref Range    Color Urine Yellow     Appearance Urine Slightly Cloudy     Glucose Urine Negative NEG^Negative mg/dL    Bilirubin Urine Negative NEG^Negative    Ketones Urine 5 (A) NEG^Negative mg/dL    Specific Gravity Urine 1.016 1.003 - 1.035    Blood Urine Negative NEG^Negative    pH Urine 5.0 5.0 - 7.0 pH    Protein Albumin Urine Negative NEG^Negative mg/dL    Urobilinogen mg/dL 0.0 0.0 - 2.0 mg/dL    Nitrite Urine Negative NEG^Negative    Leukocyte Esterase Urine Negative NEG^Negative    Source Midstream Urine     RBC Urine <1 0 - 2 /HPF    WBC Urine 2 0 - 5 /HPF    Squamous Epithelial /HPF Urine <1 0 - 1 /HPF    Mucous Urine Present (A) NEG^Negative /LPF    Hyaline Casts 78 (H) 0 - 2 /LPF   Creatinine urine calculation only    Collection Time: 02/11/19  1:09 PM   Result Value Ref Range    Creatinine Urine 208 mg/dL     I had a long discussion with the patient regarding liver transplantation which included but was not limited to  the following points:    1. Liver transplant selection committee process.  2. The federal rules for cadaveric waiting list, the size and blood type matching of the organ. The availability of living-related donor transplantation.  3. The types of donors: brain death donors, non-heart beating donors, partial liver grafts: splits and living donor grafts  4. Extended criteria  Donors (older age, steasosis) and the increased  risk of primary non-function using the extended criteria donors  5. The CDC high risk donors,  Risk of donor transmitted infections and donor transmitted malignancy  6. The liver transplant operation and the associated risks and technical complications which can include intraoperative death, post operative death,  Primary non-function, bleeding requiring re-operations, arterial and biliary complications, bowel  perforations, and intra abdominal abscess. Some of these complicaitons may require a second operation.  7. The postoperative course, the ICU stay and risk of postoperative complications which can include sepsis, MI, stroke, brain injury, pneumonia, pleural effusions, and renal dysfunction.  8. The current 1 year and 5 year graft and patient survivals.  9. The need for life long immunosuppressive therapy and the side effects of these medications, including the possibility of toxicity, opportunistic infections, risk of cancer including lymphoma, and the possibility of rejection even if the patient is taking the medication exactly as prescribed.  10. The need for compliance with medications and follow-up visits in the clinic and thereafter.  11. The patient and family understand these risks and wish to proceed to transplantation       I spent 60 minutes with the patient and more than 50% of the time was spend in direct face to face counseling.  Attestation:  Patient has been seen and evaluated by me.   Vital signs, labs, medications and orders were reviewed.   When obtained, diagnostic images were reviewed by me and interpreted as above.    The care plan was discussed with the multidisciplinary team and I agree with the findings and plan in this note, with any differences recorded in blue.    .

## 2019-02-12 NOTE — COMMITTEE REVIEW
Abdominal Committee Review Note     Evaluation Date: 2/12/2019  Committee Review Date: 2/12/2019    Organ being evaluated for: Liver    Transplant Phase: Evaluation  Transplant Status: Active    Transplant Coordinator: Yuri Eddy Jr.  Transplant Surgeon:   Javier Whatley    Referring Physician: Ninfa Nicolas    Primary Diagnosis: Cirrhosis: Fatty Liver (Luis)  Secondary Diagnosis:     Committee Review Members:  Nutrition Maggy Virgen, RD   Pharmacy Adiel Li, [facility]    - Clinical Vanessa Chin, [facility]   Transplant Liza Terrazas, APRN CNP, Manda Patterson, RN, Hiram Marks MD, Thierry James MD, Cheri Garcia RN, Javier Whatley MD, Joseph Abreu MD   Transplant Hepatology  Hiram Marks MD       Transplant Eligibility:     Committee Review Decision: Needs Re-presentation    Relative Contraindications:     Absolute Contraindications:     Committee Chair Hiram Marks MD verbally attested to the committee's decision.    Committee Discussion Details:     Rediscuss pending neuropsych testing & neurology (order placed and message routed for scheduling).    Would need vascular evaluation as part of full evaluation (see txp surgery note).     Pt's family updated of above.

## 2019-02-12 NOTE — LETTER
2/12/2019       RE: Gilmar Turk  [address] [address]     Dear Colleague,    Thank you for referring your patient, Gilmar Turk, to the [facility]ID [facility]GAN TRANSPL[facility] at [facility]. Please see a copy of my visit note below.        Again, thank you for allAssessment and Plan:  1. liver transplant evaluation - patient is a fair candidate overall. Benefits and surgical risks of a liver transplantation were discussed.  2.  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 Cardiac: Has history of a.fib  On elquis.  Questionable vegetation on  Needs cardiology work-up for ischemic cardiac disease and valvular/rhythm control   4. History of stroke:  Originally thought to be bells palsy, but MRI showed right frontal lesion.  Repeat MRI?  Possibly embolic.  5. Memory loss:  Questionable Alzheimers disease vs encephalopathy.  Needs neuropsych eval and neurology appt.  Recommend appt with with Kimberly   6. History of MRSA bacteremia: Follow-up with ID.   7.  Ascites:  Recommend paracentesis as needed   8.  History of alcohol abuse:  Has a history of a DWI and lost license.  Recommend CD eval.  Appreciate social work input..    9. Great toe amputation: sounds like poor shoe fit/infection, but should have LE doppler US to look for occlusive disease.  May also have been embolic.  Needs routine screening for infection and neoplasm    Surgical evaluation:  1. Portal Vein:Patent  2. Hepatic Artery: Open  3. TIPS: absent  4. Previous Abdominal Surgery: No  5. Hepatocellular Carcinoma: None  6. Ascites: Present - large amount  7. Costal Angle: wide  8. Portopulmonary Hypertension: absent  9. Hepatopulmonary Syndrome: absent  10. Cardiac Evaluation: needs heart catheterization  11. Nutritional Status: Moderate  12. Diabetes: Previously was borderline  13.Hypertension  Yes   14. Smoker: Yes. Smoker cigars   15: Fraility index: Yes              Patients  overall evaluation will be discussed at the Liver Transplant selection committee meeting with a final recommendation on the patients suitability for transplant to be made at that time.      Kenyetta Jay, CNP   Consult Full  Details:  Gilmar Turk was seen in consultation at the request of Dr. Nicolas  for evaluation as a potential liver transplant recipient.    HPI:   Gilmar Turk is a 68 year old year old male with nonalcoholic steatopheatitis, who presents for liver transplant evaluation.     Patient was diagnosed with liver disease in December 2018 at this time patient had a CT scan of his abdomen and was noted.  Cirrhosis of his liver.  Patient had originally presented to his primary care provider with increased abdominal distention, however this was found to be a large volume ascites.    Since his diagnosis patient has been getting weekly paracentesis he typically is tapped for around 4-5 L.      Patient denies any history of significant alcohol abuse however upon reviewing his chart it does state the patient drank a number of beers per day however family denies that this is true..    Patient has a history of memory impairment however according to patient's daughter this was likely due to hepatic encephalopathy as his [place] score did improve upon retest.    Patient does not answer many questions and relies primarily on his family.          Past Medical History:   Diagnosis Date     Atrial fibrillation (H) 1/16/2019     Bell's palsy 1/16/2019     HTN (hypertension)      Liver cirrhosis secondary to VOGEL (H) 1/16/2019     Other ascites 1/16/2019     Pre-diabetes      Past Surgical History:   Procedure Laterality Date     AMPUTATION      Great toe on right foot.      COLONOSCOPY      most likely at age 51 - due     Past Surgical History:   Procedure Laterality Date     AMPUTATION      Great toe on right foot.      COLONOSCOPY      most likely at age 51 - due     Family History   Problem Relation Age of  Onset     Diabetes Type 2  Mother      Bronchitis Mother      Heart Failure Mother      Kidney failure Father      Diabetes Type 2  Father      No Known Allergies  Prior to Admission medications    Medication Sig Start Date End Date Taking? Authorizing Provider   apixaban ANTICOAGULANT (ELIQUIS) 5 MG tablet Take 5 mg by mouth    Reported, Patient   atorvastatin (LIPITOR) 10 MG tablet Take 10 mg by mouth    Reported, Patient   atorvastatin (LIPITOR) 10 MG tablet TAKE 1 TABLET BY MOUTH ONCE A DAY ORALLY 11/23/18   Reported, Patient   Cholecalciferol (VITAMIN D) 2000 units CAPS     Reported, Patient   DOCOSAHEXAENOIC ACID PO Take 2 g by mouth    Reported, Patient   donepezil (ARICEPT) 10 MG tablet Take 10 mg by mouth    Reported, Patient   donepezil (ARICEPT) 10 MG tablet TAKE 1 TAB BY MOUTH ONCE DAILY 9/20/18   Reported, Patient   ELIQUIS 5 MG tablet TAKE 1 TAB BY MOUTH TWICE A DAY 9/20/18   Reported, Patient   escitalopram (LEXAPRO) 10 MG tablet Take 15 mg by mouth 8/24/18   Reported, Patient   escitalopram (LEXAPRO) 10 MG tablet TAKE 1.5 TABLET BY MOUTH ONCE DAILY 2/6/19   Reported, Patient   furosemide (LASIX) 40 MG tablet TAKE ONE-HALF TO 1 TABLET BY MOUTH DAILY 12/21/18   Reported, Patient   lactulose (CHRONULAC) 10 GM/15ML solution Take 20 g by mouth 1/9/19   Reported, Patient   lactulose (CHRONULAC) 10 GM/15ML solution TAKE 30 ML BY MOUTH THREE TIMES A DAY. 1/9/19   Reported, Patient   losartan (COZAAR) 25 MG tablet Take 25 mg by mouth daily 6/25/18   Reported, Patient   melatonin 3 MG tablet TAKE 1 TAB BY MOUTH AT BEDTIME FOR SEVEN DAYS 9/20/18   Reported, Patient   melatonin 5 MG tablet     Reported, Patient   metoprolol tartrate (LOPRESSOR) 25 MG tablet 12.5 mg 1/4/18   Reported, Patient   Multiple Vitamins-Minerals (CENTRUM) TABS     Reported, Patient   Multiple Vitamins-Minerals ([employer] DAILY MULTIPLE FOR MEN) TABS Take 1 tablet by mouth    Reported, Patient   potassium chloride ER (K-DUR/KLOR-CON M) 20 MEQ  CR tablet TAKE 1 TAB BY MOUTH NOW AND THEN TAKE ANOTHER IN 6 HOURS 10/1/18   Reported, Patient   rifaximin (XIFAXAN) 550 MG TABS tablet Take 550 mg by mouth 2/4/19   Reported, Patient   spironolactone (ALDACTONE) 100 MG tablet Take 100 mg by mouth daily 1/11/19   Reported, Patient   sucralfate (CARAFATE) 1 GM tablet  2/10/19   Reported, Patient   sucralfate (CARAFATE) 1 GM tablet Take 1 g by mouth 1/15/19   Reported, Patient   vitamin D3 (CHOLECALCIFEROL) 2000 units tablet Take 2,000 Units by mouth    Reported, Patient   VITAMIN D3 1000 units tablet TAKE 2 TABS (2000UNITS) BY MOUTH ONCE DAILY 9/20/18   Reported, Patient       Previous Transplant Hx: No    Cardiovascular Hx:       h/o Cardiac Issues: No       Exercise Tolerance: shortness of breath with exertion.    Potential Donor(s): Yes -  [facility]    ROS:    REVIEW OF SYSTEMS (check box if normal)  [x]                GENERAL  [x]                  PULMONARY [x]                 GENITOURINARY  [x]                 CNS                 [x]                  CARDIAC  [x]                  ENDOCRINE  [x]                 EARS,NOSE,THROAT [x]                  GASTROINTESTINAL [x]                  NEUROLOGIC    [x]                 MUSCLOSKELTAL  [x]                   HEMATOLOGY    Examination:     Vitals:  There were no vitals taken for this visit.    GENERAL APPEARANCE: alert and no distress  EYES: PERRL  HENT: mouth without ulcers or lesions  NECK: supple, no adenopathy  RESP: lungs clear to auscultation - no rales, rhonchi or wheezes  CV: regular rhythm, normal rate, no rub   ABDOMEN:  soft, nontender, no HSM or masses and bowel sounds normal. Large volume ascites   MS: extremities normal- no gross deformities noted, no evidence of inflammation in joints, no muscle tenderness  SKIN: no rash  NEURO: Normal strength and tone, sensory exam grossly normal, mentation intact and speech normal  PSYCH: mentation appears normal. and affect  normal/bright      Results:   Recent Results (from the past 168 hour(s))   X-ray Chest 2 vws [IMG36]    Collection Time: 02/11/19  7:07 AM   Result Value Ref Range    Radiologist flags Lung nodule    Treponema Abs w Reflex to RPR and Titer    Collection Time: 02/11/19  7:14 AM   Result Value Ref Range    Treponema Antibodies Nonreactive NR^Nonreactive   HIV Antigen Antibody Combo Pretransplant    Collection Time: 02/11/19  7:14 AM   Result Value Ref Range    HIV Antigen Antibody Combo Pretransplant Nonreactive NR^Nonreactive   EBV Capsid Antibody IgG    Collection Time: 02/11/19  7:14 AM   Result Value Ref Range    EBV Capsid Antibody IgG >8.0 (H) 0.0 - 0.8 AI   CMV Antibody IgG    Collection Time: 02/11/19  7:14 AM   Result Value Ref Range    CMV Antibody IgG 5.1 (H) 0.0 - 0.8 AI   CBC with platelets    Collection Time: 02/11/19  7:14 AM   Result Value Ref Range    WBC 3.3 (L) 4.0 - 11.0 10e9/L    RBC Count 3.44 (L) 4.4 - 5.9 10e12/L    Hemoglobin 11.0 (L) 13.3 - 17.7 g/dL    Hematocrit 34.1 (L) 40.0 - 53.0 %    MCV 99 78 - 100 fl    MCH 32.0 26.5 - 33.0 pg    MCHC 32.3 31.5 - 36.5 g/dL    RDW 14.5 10.0 - 15.0 %    Platelet Count 100 (L) 150 - 450 10e9/L   INR    Collection Time: 02/11/19  7:14 AM   Result Value Ref Range    INR 1.91 (H) 0.86 - 1.14   Vitamin D Deficiency    Collection Time: 02/11/19  7:14 AM   Result Value Ref Range    Vitamin D Deficiency screening 20 20 - 75 ug/L   Transferrin    Collection Time: 02/11/19  7:14 AM   Result Value Ref Range    Transferrin 114 (L) 210 - 360 mg/dL   TSH with free T4 reflex    Collection Time: 02/11/19  7:14 AM   Result Value Ref Range    TSH 5.96 (H) 0.40 - 4.00 mU/L   Prostate spec antigen screen    Collection Time: 02/11/19  7:14 AM   Result Value Ref Range    PSA 0.18 0 - 4 ug/L   Phosphorus    Collection Time: 02/11/19  7:14 AM   Result Value Ref Range    Phosphorus 3.8 2.5 - 4.5 mg/dL   Iron and iron binding capacity    Collection Time: 02/11/19  7:14 AM    Result Value Ref Range    Iron 108 35 - 180 ug/dL    Iron Binding Cap 139 (L) 240 - 430 ug/dL    Iron Saturation Index 78 (H) 15 - 46 %   AFP tumor marker    Collection Time: 02/11/19  7:14 AM   Result Value Ref Range    Alpha Fetoprotein 2.1 0 - 8 ug/L   Hepatic panel    Collection Time: 02/11/19  7:14 AM   Result Value Ref Range    Bilirubin Direct 0.3 (H) 0.0 - 0.2 mg/dL    Bilirubin Total 0.6 0.2 - 1.3 mg/dL    Albumin 2.7 (L) 3.4 - 5.0 g/dL    Protein Total 7.4 6.8 - 8.8 g/dL    Alkaline Phosphatase 154 (H) 40 - 150 U/L    ALT 50 0 - 70 U/L    AST 63 (H) 0 - 45 U/L   Basic metabolic panel    Collection Time: 02/11/19  7:14 AM   Result Value Ref Range    Sodium 140 133 - 144 mmol/L    Potassium 4.1 3.4 - 5.3 mmol/L    Chloride 106 94 - 109 mmol/L    Carbon Dioxide 27 20 - 32 mmol/L    Anion Gap 7 3 - 14 mmol/L    Glucose 99 70 - 99 mg/dL    Urea Nitrogen 23 7 - 30 mg/dL    Creatinine 1.81 (H) 0.66 - 1.25 mg/dL    GFR Estimate 38 (L) >60 mL/min/[1.73_m2]    GFR Estimate If Black 44 (L) >60 mL/min/[1.73_m2]    Calcium 8.4 (L) 8.5 - 10.1 mg/dL   Lipid Profile    Collection Time: 02/11/19  7:14 AM   Result Value Ref Range    Cholesterol 95 <200 mg/dL    Triglycerides 56 <150 mg/dL    HDL Cholesterol 43 >39 mg/dL    LDL Cholesterol Calculated 41 <100 mg/dL    Non HDL Cholesterol 52 <130 mg/dL   Antibody titer red cell    Collection Time: 02/11/19  7:14 AM   Result Value Ref Range    Antibody Titer Anti A: IgG >256  Anti B: IgM 64, IgG 128      ABO/Rh type and screen    Collection Time: 02/11/19  7:14 AM   Result Value Ref Range    ABO O     RH(D) Pos     Antibody Screen Neg     Test Valid Only At          [facility]    Specimen Expires 02/14/2019    T4 free    Collection Time: 02/11/19  7:14 AM   Result Value Ref Range    T4 Free 0.85 0.76 - 1.46 ng/dL   ABO type [BBQ1028]    Collection Time: 02/11/19  7:24 AM   Result Value Ref Range    ABO O     RH(D) Pos     Specimen  Expires 02/14/2019    EKG 12-lead, tracing only [EKG1]    Collection Time: 02/11/19  8:15 AM   Result Value Ref Range    Interpretation ECG Click View Image link to view waveform and result    Protein  random urine with Creat Ratio    Collection Time: 02/11/19  1:09 PM   Result Value Ref Range    Protein Random Urine 0.20 g/L    Protein Total Urine g/gr Creatinine 0.09 0 - 0.2 g/g Cr   UA reflex to Microscopic and Culture    Collection Time: 02/11/19  1:09 PM   Result Value Ref Range    Color Urine Yellow     Appearance Urine Slightly Cloudy     Glucose Urine Negative NEG^Negative mg/dL    Bilirubin Urine Negative NEG^Negative    Ketones Urine 5 (A) NEG^Negative mg/dL    Specific Gravity Urine 1.016 1.003 - 1.035    Blood Urine Negative NEG^Negative    pH Urine 5.0 5.0 - 7.0 pH    Protein Albumin Urine Negative NEG^Negative mg/dL    Urobilinogen mg/dL 0.0 0.0 - 2.0 mg/dL    Nitrite Urine Negative NEG^Negative    Leukocyte Esterase Urine Negative NEG^Negative    Source Midstream Urine     RBC Urine <1 0 - 2 /HPF    WBC Urine 2 0 - 5 /HPF    Squamous Epithelial /HPF Urine <1 0 - 1 /HPF    Mucous Urine Present (A) NEG^Negative /LPF    Hyaline Casts 78 (H) 0 - 2 /LPF   Creatinine urine calculation only    Collection Time: 02/11/19  1:09 PM   Result Value Ref Range    Creatinine Urine 208 mg/dL     I had a long discussion with the patient regarding liver transplantation which included but was not limited to  the following points:    1. Liver transplant selection committee process.  2. The federal rules for cadaveric waiting list, the size and blood type matching of the organ. The availability of living-related donor transplantation.  3. The types of donors: brain death donors, non-heart beating donors, partial liver grafts: splits and living donor grafts  4. Extended criteria  Donors (older age, steasosis) and the increased  risk of primary non-function using the extended criteria donors  5. The CDC high risk donors,   Risk of donor transmitted infections and donor transmitted malignancy  6. The liver transplant operation and the associated risks and technical complications which can include intraoperative death, post operative death,  Primary non-function, bleeding requiring re-operations, arterial and biliary complications, bowel perforations, and intra abdominal abscess. Some of these complicaitons may require a second operation.  7. The postoperative course, the ICU stay and risk of postoperative complications which can include sepsis, MI, stroke, brain injury, pneumonia, pleural effusions, and renal dysfunction.  8. The current 1 year and 5 year graft and patient survivals.  9. The need for life long immunosuppressive therapy and the side effects of these medications, including the possibility of toxicity, opportunistic infections, risk of cancer including lymphoma, and the possibility of rejection even if the patient is taking the medication exactly as prescribed.  10. The need for compliance with medications and follow-up visits in the clinic and thereafter.  11. The patient and family understand these risks and wish to proceed to transplantation       I spent 60 minutes with the patient and more than 50% of the time was spend in direct face to face counseling.  Attestation:  Patient has been seen and evaluated by me.   Vital signs, labs, medications and orders were reviewed.   When obtained, diagnostic images were reviewed by me and interpreted as above.    The care plan was discussed with the multidisciplinary team and I agree with the findings and plan in this note, with any differences recorded in blue.    .      Sincerely,    Javier Whatley MD

## 2019-02-12 NOTE — PROGRESS NOTES
[facility]    Hepatology New Patient Visit (CONSULT NOTE )      Primary Care provider:  Leyda Cespedes     CONSULTING HEALTHCARE PROVIDER:  Ninfa Nicolas MD, Gastroenterology [facility].      CHIEF COMPLAINT/REASON FOR THE VISIT:  Decompensated cirrhosis.      HISTORY OF PRESENT ILLNESS:  Mr. Turk is a 68-year-old white male who carries a presumptive diagnosis  of Alzheimer's versus hepatic encephalopathy related with his cirrhosis.  He is coming to us for evaluation for the latter and to see if he could be a candidate for liver transplantation.  He also has other diagnosis including hyperlipidemia, hypertension, diabetes mellitus, on no medication thus far and right toe osteomyelitis and status post amputation.      Today he tells me that the cirrhosis story started sometime in 09/2018, when he was noticed to have abdominal distention and on further workup turned out to be ascites.  He at that point got more evaluation at [facility] and this included upper endoscopy which showed that he had esophageal varices which were banded and he is having to repeat that in 02/2018.  According to his wife and his daughter who were telling me most of the history, in the last 1 year Mr. Turk was more confused and was seen by a neurologist who thought that he might have Alzheimer's, although the role of the hepatic encephalopathy was not clarified.  Neurology, the last time they saw him was in August at which time they continued medications for Alzheimer's.      Regarding the cirrhosis and complications associated with it, he is requiring some paracentesis and he had for his esophageal varices banding, although he never bled.  He does not have any history of hematochezia, hematemesis or melena.  He has also as far as other symptoms are concerned, lost weight like 80 pounds since 09/2018, his appetite is okay.  He does not have any abdominal pain, nausea or vomiting.  He is having 2  bowel movements and this is with his lactulose.  Denies having jaundice in the past, does not have any pruritus and has not noted any alcoholic beverages for the last 7 months.      Medical hx Surgical hx   Past Medical History:   Diagnosis Date     Atrial fibrillation (H) 1/16/2019     Bell's palsy 1/16/2019     HTN (hypertension)      Liver cirrhosis secondary to VOGEL (H) 1/16/2019     Other ascites 1/16/2019     Pre-diabetes       Past Surgical History:   Procedure Laterality Date     AMPUTATION      Great toe on right foot.      COLONOSCOPY      most likely at age 51 - due          Medications  Prior to Admission medications    Medication Sig Start Date End Date Taking? Authorizing Provider   apixaban ANTICOAGULANT (ELIQUIS) 5 MG tablet Take 5 mg by mouth   Yes Reported, Patient   atorvastatin (LIPITOR) 10 MG tablet Take 10 mg by mouth   Yes Reported, Patient   atorvastatin (LIPITOR) 10 MG tablet TAKE 1 TABLET BY MOUTH ONCE A DAY ORALLY 11/23/18  Yes Reported, Patient   Cholecalciferol (VITAMIN D) 2000 units CAPS    Yes Reported, Patient   DOCOSAHEXAENOIC ACID PO Take 2 g by mouth   Yes Reported, Patient   donepezil (ARICEPT) 10 MG tablet Take 10 mg by mouth   Yes Reported, Patient   donepezil (ARICEPT) 10 MG tablet TAKE 1 TAB BY MOUTH ONCE DAILY 9/20/18  Yes Reported, Patient   ELIQUIS 5 MG tablet TAKE 1 TAB BY MOUTH TWICE A DAY 9/20/18  Yes Reported, Patient   escitalopram (LEXAPRO) 10 MG tablet Take 15 mg by mouth 8/24/18  Yes Reported, Patient   escitalopram (LEXAPRO) 10 MG tablet TAKE 1.5 TABLET BY MOUTH ONCE DAILY 2/6/19  Yes Reported, Patient   furosemide (LASIX) 40 MG tablet TAKE ONE-HALF TO 1 TABLET BY MOUTH DAILY 12/21/18  Yes Reported, Patient   lactulose (CHRONULAC) 10 GM/15ML solution Take 20 g by mouth 1/9/19  Yes Reported, Patient   lactulose (CHRONULAC) 10 GM/15ML solution TAKE 30 ML BY MOUTH THREE TIMES A DAY. 1/9/19  Yes Reported, Patient   losartan (COZAAR) 25 MG tablet Take 25 mg by mouth  daily 6/25/18  Yes Reported, Patient   melatonin 3 MG tablet TAKE 1 TAB BY MOUTH AT BEDTIME FOR SEVEN DAYS 9/20/18  Yes Reported, Patient   melatonin 5 MG tablet    Yes Reported, Patient   metoprolol tartrate (LOPRESSOR) 25 MG tablet 12.5 mg 1/4/18  Yes Reported, Patient   Multiple Vitamins-Minerals (CENTRUM) TABS    Yes Reported, Patient   Multiple Vitamins-Minerals ([employer] DAILY MULTIPLE FOR MEN) TABS Take 1 tablet by mouth   Yes Reported, Patient   potassium chloride ER (K-DUR/KLOR-CON M) 20 MEQ CR tablet TAKE 1 TAB BY MOUTH NOW AND THEN TAKE ANOTHER IN 6 HOURS 10/1/18  Yes Reported, Patient   rifaximin (XIFAXAN) 550 MG TABS tablet Take 550 mg by mouth 2/4/19  Yes Reported, Patient   spironolactone (ALDACTONE) 100 MG tablet Take 100 mg by mouth daily 1/11/19  Yes Reported, Patient   sucralfate (CARAFATE) 1 GM tablet  2/10/19  Yes Reported, Patient   sucralfate (CARAFATE) 1 GM tablet Take 1 g by mouth 1/15/19  Yes Reported, Patient   vitamin D3 (CHOLECALCIFEROL) 2000 units tablet Take 2,000 Units by mouth   Yes Reported, Patient   VITAMIN D3 1000 units tablet TAKE 2 TABS (2000UNITS) BY MOUTH ONCE DAILY 9/20/18  Yes Reported, Patient       Allergies  No Known Allergies    Family hx Social hx   History reviewed. No pertinent family history.   Social History     Tobacco Use     Smoking status: Former Smoker     Types: Cigars, cigarillos or filtered cigars     Smokeless tobacco: Never Used     Tobacco comment: Hasnt smoked a cigar in years   Substance Use Topics     Alcohol use: No     Frequency: Never     Drug use: No          REVIEW OF SYSTEMS:  Mr. 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  He does not have any cough or shortness of breath or chest pain.  Please note that the patient is on Eliquis for atrial fibrillation.  He has borderline diabetes mellitus  and is on no medications.  Does not have any other endocrinopathy, although he has also easy bruising, no anemia, no degenerative joint disease and no psychiatric issues.  He has no eye problems.  He has bilateral hearing decreased and does not have any skin issues.  Otherwise, a comprehensive review of systems was noncontributory.             Examination  /70   Pulse 72   Temp 97.6  F (36.4  C) (Oral)   Ht 1.829 m (6')   Wt 80.5 kg (177 lb 6.4 oz)   SpO2 99%   BMI 24.06 kg/m    Body mass index is 24.06 kg/m .    Gen- well, NAD, A+Ox3, normal color  Eye- EOMI  ENT- MMM, normal oropharynx  Lym- no palpable lymphadenopathy  CVS- S1, S2 normal, no added sounds, RRR  RS- CTA  Abd- Mildly distended. Not tender. Positive for shifting dullness.  Extr- pulses good, no JULIANE  MS- hands normal- no clubbing  Neuro- A+Ox3, no asterixis  Skin- no rash or jaundice  Psych- normal mood    Laboratory  Lab Results   Component Value Date     02/11/2019    POTASSIUM 4.1 02/11/2019    CHLORIDE 106 02/11/2019    CO2 27 02/11/2019    BUN 23 02/11/2019    CR 1.81 02/11/2019       Lab Results   Component Value Date    BILITOTAL 0.6 02/11/2019    ALT 50 02/11/2019    AST 63 02/11/2019    ALKPHOS 154 02/11/2019       Lab Results   Component Value Date    ALBUMIN 2.7 02/11/2019    PROTTOTAL 7.4 02/11/2019        Lab Results   Component Value Date    WBC 3.3 02/11/2019    HGB 11.0 02/11/2019    MCV 99 02/11/2019     02/11/2019       Lab Results   Component Value Date    INR 1.91 02/11/2019     MELD-Na score: 19 at 2/11/2019  7:14 AM  MELD score: 19 at 2/11/2019  7:14 AM  Calculated from:  Serum Creatinine: 1.81 mg/dL at 2/11/2019  7:14 AM  Serum Sodium: 140 mmol/L (Rounded to 137 mmol/L) at 2/11/2019  7:14 AM  Total Bilirubin: 0.6 mg/dL (Rounded to 1 mg/dL) at 2/11/2019  7:14 AM  INR(ratio): 1.91 at 2/11/2019  7:14 AM  Age: 68 years      Radiology    ASSESSMENT AND PLAN:  Mr. Turk is a 68-year-old white male who  carries a diagnosis of VOGEL/TOMMIE related cirrhosis.  He has other comorbidities including prediabetes, hyperlipidemia and a past history of alcohol use.  His diagnosis was thought to be a combination of VOGEL/TOMMIE.  He has a MELD score of 19, and he is above 15 so he qualifies to be evaluated.  The following issues were addressed did with him and his family.     1.  The first one is altered mental status with a diagnosis Alzheimer's that was made prior to him developed decompensation of his liver disease.  It is not very clear what workup was done by an outside neurologist, but we will make him see one of our neurologists here.  The role of hepatic encephalopathy on this also is not clear.  The patient is on rifaximin and lactulose.  If cleared by Neurology for liver transplantation, then that is not going to be issue, but if he has Alzheimer's that might impact on him being listed.     2.  He also has atrial fibrillation and is on Eliquis.  He is supposed to see Cardiology. Dobutamine stress echo cancelled due to patient being in Atrial Fibrillation. Please note that the  Echocardiogram showed  global and left regional function was normal.  He is supposed to have a Lexiscan stress test, and we will await their input.     3.  He is also going to meet with our .  There was a question that he might have had a DUI in the past.  This will be clarified if this is an issue.   4.  Until he is listed,  for his surveillance for HCC and his EGDs which is scheduled for esophageal varices and his paracentesis, he will follow with Dr. Dana Nicolas and the GI team at [facility] unless he gets listed here at which time we will transfer his care here.        This was a 1 hour visit, of which we did discuss with the patient and his family about transplantation including living donor liver transplantation, as they were interested and they are going to meet with Gabriel Whatley today to get more information on that topic.       cc:  Ninfa Nicolas MD, [facility]         Hiram Marks MD  Hepatology  [facility]

## 2019-02-12 NOTE — LETTER
2/12/2019       RE: Gilmar Turk  [address] [place] [address]     Dear Colleague,    Thank you for referring your patient, Gilmar Turk, to the [facility] HEPATOLOGY at [facility]. Please see a copy of my visit note below.    [facility]    Hepatology New Patient Visit (CONSULT NOTE )      Primary Care provider:  Leyda Cespedes     CONSULTING HEALTHCARE PROVIDER:  Ninfa Nicolas MD, Gastroenterology [facility].      CHIEF COMPLAINT/REASON FOR THE VISIT:  Decompensated cirrhosis.      HISTORY OF PRESENT ILLNESS:  Mr. Turk is a 68-year-old white male who carries a presumptive diagnosis  of Alzheimer's versus hepatic encephalopathy related with his cirrhosis.  He is coming to us for evaluation for the latter and to see if he could be a candidate for liver transplantation.  He also has other diagnosis including hyperlipidemia, hypertension, diabetes mellitus, on no medication thus far and right toe osteomyelitis and status post amputation.      Today he tells me that the cirrhosis story started sometime in 09/2018, when he was noticed to have abdominal distention and on further workup turned out to be ascites.  He at that point got more evaluation at [facility] and this included upper endoscopy which showed that he had esophageal varices which were banded and he is having to repeat that in 02/2018.  According to his wife and his daughter who were telling me most of the history, in the last 1 year Mr. Turk was more confused and was seen by a neurologist who thought that he might have Alzheimer's, although the role of the hepatic encephalopathy was not clarified.  Neurology, the last time they saw him was in August at which time they continued medications for Alzheimer's.      Regarding the cirrhosis and complications associated with it, he is requiring some paracentesis and he had for his esophageal varices banding,  although he never bled.  He does not have any history of hematochezia, hematemesis or melena.  He has also as far as other symptoms are concerned, lost weight like 80 pounds since 09/2018, his appetite is okay.  He does not have any abdominal pain, nausea or vomiting.  He is having 2 bowel movements and this is with his lactulose.  Denies having jaundice in the past, does not have any pruritus and has not noted any alcoholic beverages for the last 7 months.      Medical hx Surgical hx   Past Medical History:   Diagnosis Date     Atrial fibrillation (H) 1/16/2019     Bell's palsy 1/16/2019     HTN (hypertension)      Liver cirrhosis secondary to VOGEL (H) 1/16/2019     Other ascites 1/16/2019     Pre-diabetes       Past Surgical History:   Procedure Laterality Date     AMPUTATION      Great toe on right foot.      COLONOSCOPY      most likely at age 51 - due          Medications  Prior to Admission medications    Medication Sig Start Date End Date Taking? Authorizing Provider   apixaban ANTICOAGULANT (ELIQUIS) 5 MG tablet Take 5 mg by mouth   Yes Reported, Patient   atorvastatin (LIPITOR) 10 MG tablet Take 10 mg by mouth   Yes Reported, Patient   atorvastatin (LIPITOR) 10 MG tablet TAKE 1 TABLET BY MOUTH ONCE A DAY ORALLY 11/23/18  Yes Reported, Patient   Cholecalciferol (VITAMIN D) 2000 units CAPS    Yes Reported, Patient   DOCOSAHEXAENOIC ACID PO Take 2 g by mouth   Yes Reported, Patient   donepezil (ARICEPT) 10 MG tablet Take 10 mg by mouth   Yes Reported, Patient   donepezil (ARICEPT) 10 MG tablet TAKE 1 TAB BY MOUTH ONCE DAILY 9/20/18  Yes Reported, Patient   ELIQUIS 5 MG tablet TAKE 1 TAB BY MOUTH TWICE A DAY 9/20/18  Yes Reported, Patient   escitalopram (LEXAPRO) 10 MG tablet Take 15 mg by mouth 8/24/18  Yes Reported, Patient   escitalopram (LEXAPRO) 10 MG tablet TAKE 1.5 TABLET BY MOUTH ONCE DAILY 2/6/19  Yes Reported, Patient   furosemide (LASIX) 40 MG tablet TAKE ONE-HALF TO 1 TABLET BY MOUTH DAILY 12/21/18   Yes Reported, Patient   lactulose (CHRONULAC) 10 GM/15ML solution Take 20 g by mouth 1/9/19  Yes Reported, Patient   lactulose (CHRONULAC) 10 GM/15ML solution TAKE 30 ML BY MOUTH THREE TIMES A DAY. 1/9/19  Yes Reported, Patient   losartan (COZAAR) 25 MG tablet Take 25 mg by mouth daily 6/25/18  Yes Reported, Patient   melatonin 3 MG tablet TAKE 1 TAB BY MOUTH AT BEDTIME FOR SEVEN DAYS 9/20/18  Yes Reported, Patient   melatonin 5 MG tablet    Yes Reported, Patient   metoprolol tartrate (LOPRESSOR) 25 MG tablet 12.5 mg 1/4/18  Yes Reported, Patient   Multiple Vitamins-Minerals (CENTRUM) TABS    Yes Reported, Patient   Multiple Vitamins-Minerals (CVS DAILY MULTIPLE FOR MEN) TABS Take 1 tablet by mouth   Yes Reported, Patient   potassium chloride ER (K-DUR/KLOR-CON M) 20 MEQ CR tablet TAKE 1 TAB BY MOUTH NOW AND THEN TAKE ANOTHER IN 6 HOURS 10/1/18  Yes Reported, Patient   rifaximin (XIFAXAN) 550 MG TABS tablet Take 550 mg by mouth 2/4/19  Yes Reported, Patient   spironolactone (ALDACTONE) 100 MG tablet Take 100 mg by mouth daily 1/11/19  Yes Reported, Patient   sucralfate (CARAFATE) 1 GM tablet  2/10/19  Yes Reported, Patient   sucralfate (CARAFATE) 1 GM tablet Take 1 g by mouth 1/15/19  Yes Reported, Patient   vitamin D3 (CHOLECALCIFEROL) 2000 units tablet Take 2,000 Units by mouth   Yes Reported, Patient   VITAMIN D3 1000 units tablet TAKE 2 TABS (2000UNITS) BY MOUTH ONCE DAILY 9/20/18  Yes Reported, Patient       Allergies  No Known Allergies    Family hx Social hx   History reviewed. No pertinent family history.   Social History     Tobacco Use     Smoking status: Former Smoker     Types: Cigars, cigarillos or filtered cigars     Smokeless tobacco: Never Used     Tobacco comment: Hasnt smoked a cigar in years   Substance Use Topics     Alcohol use: No     Frequency: Never     Drug use: No          REVIEW OF SYSTEMS:  Mr. 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  He does not have any cough or shortness of breath or chest pain.  Please note that the patient is on Eliquis for atrial fibrillation.  He has borderline diabetes mellitus and is on no medications.  Does not have any other endocrinopathy, although he has also easy bruising, no anemia, no degenerative joint disease and no psychiatric issues.  He has no eye problems.  He has bilateral hearing decreased and does not have any skin issues.  Otherwise, a comprehensive review of systems was noncontributory.             Examination  /70   Pulse 72   Temp 97.6  F (36.4  C) (Oral)   Ht 1.829 m (6')   Wt 80.5 kg (177 lb 6.4 oz)   SpO2 99%   BMI 24.06 kg/m     Body mass index is 24.06 kg/m .    Gen- well, NAD, A+Ox3, normal color  Eye- EOMI  ENT- MMM, normal oropharynx  Lym- no palpable lymphadenopathy  CVS- S1, S2 normal, no added sounds, RRR  RS- CTA  Abd- Mildly distended. Not tender. Positive for shifting dullness.  Extr- pulses good, no JULIANE  MS- hands normal- no clubbing  Neuro- A+Ox3, no asterixis  Skin- no rash or jaundice  Psych- normal mood    Laboratory  Lab Results   Component Value Date     02/11/2019    POTASSIUM 4.1 02/11/2019    CHLORIDE 106 02/11/2019    CO2 27 02/11/2019    BUN 23 02/11/2019    CR 1.81 02/11/2019       Lab Results   Component Value Date    BILITOTAL 0.6 02/11/2019    ALT 50 02/11/2019    AST 63 02/11/2019    ALKPHOS 154 02/11/2019       Lab Results   Component Value Date    ALBUMIN 2.7 02/11/2019    PROTTOTAL 7.4 02/11/2019        Lab Results   Component Value Date    WBC 3.3 02/11/2019    HGB 11.0 02/11/2019    MCV 99 02/11/2019     02/11/2019       Lab Results   Component Value Date    INR 1.91 02/11/2019     MELD-Na score: 19 at 2/11/2019  7:14 AM  MELD score: 19 at 2/11/2019  7:14 AM  Calculated from:  Serum Creatinine: 1.81 mg/dL at 2/11/2019  7:14  AM  Serum Sodium: 140 mmol/L (Rounded to 137 mmol/L) at 2/11/2019  7:14 AM  Total Bilirubin: 0.6 mg/dL (Rounded to 1 mg/dL) at 2/11/2019  7:14 AM  INR(ratio): 1.91 at 2/11/2019  7:14 AM  Age: 68 years      Radiology    ASSESSMENT AND PLAN:  Mr. Turk is a 68-year-old white male who carries a diagnosis of VOGEL/TOMMIE related cirrhosis.  He has other comorbidities including prediabetes, hyperlipidemia and a past history of alcohol use.  His diagnosis was thought to be a combination of VOGEL/TOMMIE.  He has a MELD score of 19, and he is above 15 so he qualifies to be evaluated.  The following issues were addressed did with him and his family.     1.  The first one is altered mental status with a diagnosis Alzheimer's that was made prior to him developed decompensation of his liver disease.  It is not very clear what workup was done by an outside neurologist, but we will make him see one of our neurologists here.  The role of hepatic encephalopathy on this also is not clear.  The patient is on rifaximin and lactulose.  If cleared by Neurology for liver transplantation, then that is not going to be issue, but if he has Alzheimer's that might impact on him being listed.     2.  He also has atrial fibrillation and is on Eliquis.  He is supposed to see Cardiology. Dobutamine stress echo cancelled due to patient being in Atrial Fibrillation. Please note that the  Echocardiogram showed  global and left regional function was normal.  He is supposed to have a Lexiscan stress test, and we will await their input.     3.  He is also going to meet with our .  There was a question that he might have had a DUI in the past.  This will be clarified if this is an issue.   4.  Until he is listed,  for his surveillance for HCC and his EGDs which is scheduled for esophageal varices and his paracentesis, he will follow with Dr. Dana Nicolas and the GI team at [facility] unless he gets listed here at which time we will transfer  his care here.        This was a 1 hour visit, of which we did discuss with the patient and his family about transplantation including living donor liver transplantation, as they were interested and they are going to meet with Gabriel Whatley today to get more information on that topic.         Seer dictated note below.    Again, thank you for allowing me to participate in the care of your patient.      Sincerely,    Hiram Marks MD    cc:  Ninfa Nicolas MD, [facility]

## 2019-02-12 NOTE — PROGRESS NOTES
I am delighted to see Gilmar Turk in consultation for cardiovascular assessment prior to anticipated liver transplant. He is here with his wife, his daughter who is his main caretaker, and her .      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 He has no known prior coronary artery disease. Has atrial fibrillation incidentally diagnosed over 2 years ago, treated with rate control and anticoagulation. He had seen cardiology at [facility]. He also has Alzheimer's dementia, and history is mainly from wife and daughter.    He denies any shortness of breath or chest pressure. No dizziness, syncope, palpitations. Chronic lower extremity edema much improved with weekly paracentesis. No bleeding in urine or stool, no hematemesis. Does have known esophageal varices in process of banding.    The following portions of the patient's history were reviewed and updated as appropriate: allergies, current medications, past family history, past medical history, past social history, past surgical history, and the problem list.    Past Medical History:  1. 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 Atrial fibrillation, chronic; no cardioversions or antiarrhythmic drugs  3. Hypertension. Losartan is listed on medications - they are not sure if he's taking  4. Diabetes with osteomyelitis; not currently on hypoglycemics  5. Alzheimer's dementia     Medications (personally reviewed by me with family):  Apixaban 5 mg bid  Atorvastatin 10 every day ? If he's taking at home; family thinks it might have been stopped due to hypotension  Metoprolol 12.5 bid  Lactulose  Lexapro    Allergies:  No Known Allergies    Family History:   Family History   Problem Relation Age of Onset     Diabetes Type 2  Mother      Bronchitis Mother      Heart Failure Mother      Kidney failure Father      Diabetes Type 2  Father        Psychosocial history:  reports that he has  quit smoking. His smoking use included cigars, cigarillos or filtered cigars. he has never used smokeless tobacco. He reports that he does not drink alcohol or use drugs.    Review of systems:   Cardiovascular: No palpitations, chest pain, shortness of breath at rest, dyspnea with exertion, orthopnea, paroxysmal nocturia dyspnea, nocturia, dizziness, syncope.    In addition,   Constitutional: No change in weight, sleep or appetite.  Normal energy.  No fever or chills  Eyes: Negative for vision changes or eye problems  ENT: No problems with ears, nose or throat.  No difficulty swallowing.  Resp: No coughing, wheezing or shortness of breath  GI: No nausea, vomiting,  heartburn, abdominal pain, diarrhea, constipation or change in bowel habits  : No urinary frequency or dysuria, bladder or kidney problems  Musculoskeletal: No significant muscle or joint pains  Neurologic: No headaches, numbness, tingling, weakness, problems with balance or coordination  Psychiatric: No problems with anxiety, depression or mental health  Heme/immune/allergy: No history of bleeding or clotting problems or anemia.  No allergies or immune system problems  Integumentary: No rashes,worrisome lesions or skin problems      Physical examination  Vitals: 110/70, HR 72  BMI= 24 (80kg)    Constitutional: In general, the patient is a pleasant male in no apparent distress  Eyes: PERRLA.  EOMI.  Sclerae white, not injected.  ENT/mouth: Normiocephalic and atraumatic.  Nares clear.  Pharynx without erythema or exudate.  Dentition intact.  No adenopathy.  No thyromegaly. Carotids +2/2 bilaterally without bruits.  No jugular venous distension.   Card/Vasc: The PMI is in the 5th ICS in the midclavicular line. There is no heave. Irregularly irregular. Normal S1, S2. No murmur, rub, click, or gallop. Pulses are normal bilaterally throughout. 1+ peripheral edema.  Respiratory: Clear to asculation.  No ronchi, wheezes, rales.  No dullness to percussion.  "  GI: Abdomen is non tender but distended.  No bruits.   Integument: No significant bruises or rashes  Neurological: The neurological examination reveal a patient who is not very verbal, appears to have diffculty answering simple questions such as \"do you have any pain\" - looks to his family for answers..    Psych: Normal  Heme/Lymph/Immun: no significant adenopathy      I have reviewed the following labs/imaging:  Labs 2/11/19: cholesterol 95, HDL 43, LDL 41, TG 56, K 4.1, cr 1.81, hgb 11, plt 100K, TSH 5.96, T4 0.85, INR 1.9, ALT 50, AST 63, albumin 2.7    I have reviewed records from [facility]. Relevant findings are:  Echo 11/20/2018: EF 65%, no valve abnl, LA 4.7 cm  Holter 4/2018: Afib, average rate 60-90 bpm    I have personally and independently reviewed the following:  EKG 2/11/19: afib, 88 bpm, RBBB, PVCs    Assessment :  1. Cardiovascular assessment. No prior CAD history. Nuclear stress test scheduled for today. He probably doesn't need atorvastatin with his liver failure  2. Atrial fibrillation, chronic, unknown duration. EVC2VD3-NCFx score is 3 for age, hypertension, and diabetes. 69yo, 80 kg, cr 1.8, he is on appropriate apixaban dose of 5 bid. Ventricular rate is controlled.  3. Liver cirrhosis.  4. CKD. Creatinine 1.8.    Plan:  1. I will f/u on nuclear stress test  2. He probably doesn't need to continue atorvastatin  3. With his relatively low BP he likely doesn't need losartan  4. Cautioned him and family on bleeding risk with apixaban - if he has any bleeding complications will need to stop  5. PATI - per family, lasix was held, and spironolactone never started due to renal function      I spent a total of 30  minutes face to face with  Gilmar Turk during today's office visit. Over 50% of this time was spent counseling the patient and/or coordinating care regarding future management plans.        The patient is to return pending stress test. The patient understood the treatment plan as outlined " above.  There were no barriers to learning.      Ebony Plaza MD

## 2019-02-12 NOTE — NURSING NOTE
Chief Complaint   Patient presents with     New Patient     69 yo male present for pre-liver evaluation     Vitals were taken and medications were reconciled.     Alejandra Glynn CMA    12:03 PM

## 2019-02-12 NOTE — PATIENT INSTRUCTIONS
"You were seen today in the Cardiovascular Clinic at the [facility].     Cardiology Providers you saw during your visit: Dr. Ebony Plaza    Diagnosis:  Atrial fibrillation    Results: discussed with patient    Orders:   None    Medication Changes:   none    Recommendations:   Consider stopping atorvastatin  Check on losartan - probably do not need it    Follow-up:   As needed         Please feel free to call me with any questions or concerns.       Deisy Kaye LPN     Questions and [phone].   First press #1 for the [facility] and then press #3 for \"Medical Questions\" to reach us Cardiology Nurses.      On Call Cardiologist for after hours or on weekends: [phone]   option #4 and ask to speak to the on-call Cardiologist.          If you need a medication refill please contact your pharmacy.  Please allow 3 business days for your refill to be completed.    "

## 2019-02-12 NOTE — LETTER
2/12/2019      RE: Gilmar Turk  [address] [address]       [facility]    Hepatology New Patient Visit (CONSULT NOTE )      Primary Care provider:  Leyda Cespedes     CONSULTING HEALTHCARE PROVIDER:  Ninfa Nicolas MD, Gastroenterology [facility].      CHIEF COMPLAINT/REASON FOR THE VISIT:  Decompensated cirrhosis.      HISTORY OF PRESENT ILLNESS:  Mr. Turk is a 68-year-old white male who carries a presumptive diagnosis  of Alzheimer's versus hepatic encephalopathy related with his cirrhosis.  He is coming to us for evaluation for the latter and to see if he could be a candidate for liver transplantation.  He also has other diagnosis including hyperlipidemia, hypertension, diabetes mellitus, on no medication thus far and right toe osteomyelitis and status post amputation.      Today he tells me that the cirrhosis story started sometime in 09/2018, when he was noticed to have abdominal distention and on further workup turned out to be ascites.  He at that point got more evaluation at [facility] and this included upper endoscopy which showed that he had esophageal varices which were banded and he is having to repeat that in 02/2018.  According to his wife and his daughter who were telling me most of the history, in the last 1 year Mr. Turk was more confused and was seen by a neurologist who thought that he might have Alzheimer's, although the role of the hepatic encephalopathy was not clarified.  Neurology, the last time they saw him was in August at which time they continued medications for Alzheimer's.      Regarding the cirrhosis and complications associated with it, he is requiring some paracentesis and he had for his esophageal varices banding, although he never bled.  He does not have any history of hematochezia, hematemesis or melena.  He has also as far as other symptoms are concerned, lost weight like 80 pounds since 09/2018, his appetite  is okay.  He does not have any abdominal pain, nausea or vomiting.  He is having 2 bowel movements and this is with his lactulose.  Denies having jaundice in the past, does not have any pruritus and has not noted any alcoholic beverages for the last 7 months.      Medical hx Surgical hx   Past Medical History:   Diagnosis Date     Atrial fibrillation (H) 1/16/2019     Bell's palsy 1/16/2019     HTN (hypertension)      Liver cirrhosis secondary to VOGEL (H) 1/16/2019     Other ascites 1/16/2019     Pre-diabetes       Past Surgical History:   Procedure Laterality Date     AMPUTATION      Great toe on right foot.      COLONOSCOPY      most likely at age 51 - due          Medications  Prior to Admission medications    Medication Sig Start Date End Date Taking? Authorizing Provider   apixaban ANTICOAGULANT (ELIQUIS) 5 MG tablet Take 5 mg by mouth   Yes Reported, Patient   atorvastatin (LIPITOR) 10 MG tablet Take 10 mg by mouth   Yes Reported, Patient   atorvastatin (LIPITOR) 10 MG tablet TAKE 1 TABLET BY MOUTH ONCE A DAY ORALLY 11/23/18  Yes Reported, Patient   Cholecalciferol (VITAMIN D) 2000 units CAPS    Yes Reported, Patient   DOCOSAHEXAENOIC ACID PO Take 2 g by mouth   Yes Reported, Patient   donepezil (ARICEPT) 10 MG tablet Take 10 mg by mouth   Yes Reported, Patient   donepezil (ARICEPT) 10 MG tablet TAKE 1 TAB BY MOUTH ONCE DAILY 9/20/18  Yes Reported, Patient   ELIQUIS 5 MG tablet TAKE 1 TAB BY MOUTH TWICE A DAY 9/20/18  Yes Reported, Patient   escitalopram (LEXAPRO) 10 MG tablet Take 15 mg by mouth 8/24/18  Yes Reported, Patient   escitalopram (LEXAPRO) 10 MG tablet TAKE 1.5 TABLET BY MOUTH ONCE DAILY 2/6/19  Yes Reported, Patient   furosemide (LASIX) 40 MG tablet TAKE ONE-HALF TO 1 TABLET BY MOUTH DAILY 12/21/18  Yes Reported, Patient   lactulose (CHRONULAC) 10 GM/15ML solution Take 20 g by mouth 1/9/19  Yes Reported, Patient   lactulose (CHRONULAC) 10 GM/15ML solution TAKE 30 ML BY MOUTH THREE TIMES A DAY.  1/9/19  Yes Reported, Patient   losartan (COZAAR) 25 MG tablet Take 25 mg by mouth daily 6/25/18  Yes Reported, Patient   melatonin 3 MG tablet TAKE 1 TAB BY MOUTH AT BEDTIME FOR SEVEN DAYS 9/20/18  Yes Reported, Patient   melatonin 5 MG tablet    Yes Reported, Patient   metoprolol tartrate (LOPRESSOR) 25 MG tablet 12.5 mg 1/4/18  Yes Reported, Patient   Multiple Vitamins-Minerals (CENTRUM) TABS    Yes Reported, Patient   Multiple Vitamins-Minerals ([employer] DAILY MULTIPLE FOR MEN) TABS Take 1 tablet by mouth   Yes Reported, Patient   potassium chloride ER (K-DUR/KLOR-CON M) 20 MEQ CR tablet TAKE 1 TAB BY MOUTH NOW AND THEN TAKE ANOTHER IN 6 HOURS 10/1/18  Yes Reported, Patient   rifaximin (XIFAXAN) 550 MG TABS tablet Take 550 mg by mouth 2/4/19  Yes Reported, Patient   spironolactone (ALDACTONE) 100 MG tablet Take 100 mg by mouth daily 1/11/19  Yes Reported, Patient   sucralfate (CARAFATE) 1 GM tablet  2/10/19  Yes Reported, Patient   sucralfate (CARAFATE) 1 GM tablet Take 1 g by mouth 1/15/19  Yes Reported, Patient   vitamin D3 (CHOLECALCIFEROL) 2000 units tablet Take 2,000 Units by mouth   Yes Reported, Patient   VITAMIN D3 1000 units tablet TAKE 2 TABS (2000UNITS) BY MOUTH ONCE DAILY 9/20/18  Yes Reported, Patient       Allergies  No Known Allergies    Family hx Social hx   History reviewed. No pertinent family history.   Social History     Tobacco Use     Smoking status: Former Smoker     Types: Cigars, cigarillos or filtered cigars     Smokeless tobacco: Never Used     Tobacco comment: Hasnt smoked a cigar in years   Substance Use Topics     Alcohol use: No     Frequency: Never     Drug use: No          REVIEW OF SYSTEMS:  Mr. 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  He does not have any cough or shortness of breath or chest pain.  Please note that the  patient is on Eliquis for atrial fibrillation.  He has borderline diabetes mellitus and is on no medications.  Does not have any other endocrinopathy, although he has also easy bruising, no anemia, no degenerative joint disease and no psychiatric issues.  He has no eye problems.  He has bilateral hearing decreased and does not have any skin issues.  Otherwise, a comprehensive review of systems was noncontributory.             Examination  /70   Pulse 72   Temp 97.6  F (36.4  C) (Oral)   Ht 1.829 m (6')   Wt 80.5 kg (177 lb 6.4 oz)   SpO2 99%   BMI 24.06 kg/m     Body mass index is 24.06 kg/m .    Gen- well, NAD, A+Ox3, normal color  Eye- EOMI  ENT- MMM, normal oropharynx  Lym- no palpable lymphadenopathy  CVS- S1, S2 normal, no added sounds, RRR  RS- CTA  Abd- Mildly distended. Not tender. Positive for shifting dullness.  Extr- pulses good, no JULIANE  MS- hands normal- no clubbing  Neuro- A+Ox3, no asterixis  Skin- no rash or jaundice  Psych- normal mood    Laboratory  Lab Results   Component Value Date     02/11/2019    POTASSIUM 4.1 02/11/2019    CHLORIDE 106 02/11/2019    CO2 27 02/11/2019    BUN 23 02/11/2019    CR 1.81 02/11/2019       Lab Results   Component Value Date    BILITOTAL 0.6 02/11/2019    ALT 50 02/11/2019    AST 63 02/11/2019    ALKPHOS 154 02/11/2019       Lab Results   Component Value Date    ALBUMIN 2.7 02/11/2019    PROTTOTAL 7.4 02/11/2019        Lab Results   Component Value Date    WBC 3.3 02/11/2019    HGB 11.0 02/11/2019    MCV 99 02/11/2019     02/11/2019       Lab Results   Component Value Date    INR 1.91 02/11/2019     MELD-Na score: 19 at 2/11/2019  7:14 AM  MELD score: 19 at 2/11/2019  7:14 AM  Calculated from:  Serum Creatinine: 1.81 mg/dL at 2/11/2019  7:14 AM  Serum Sodium: 140 mmol/L (Rounded to 137 mmol/L) at 2/11/2019  7:14 AM  Total Bilirubin: 0.6 mg/dL (Rounded to 1 mg/dL) at 2/11/2019  7:14 AM  INR(ratio): 1.91 at 2/11/2019  7:14 AM  Age: 68  years      Radiology    ASSESSMENT AND PLAN:  Mr. Turk is a 68-year-old white male who carries a diagnosis of VOGEL/TOMMIE related cirrhosis.  He has other comorbidities including prediabetes, hyperlipidemia and a past history of alcohol use.  His diagnosis was thought to be a combination of VOGEL/TOMMIE.  He has a MELD score of 19, and he is above 15 so he qualifies to be evaluated.  The following issues were addressed did with him and his family.     1.  The first one is altered mental status with a diagnosis Alzheimer's that was made prior to him developed decompensation of his liver disease.  It is not very clear what workup was done by an outside neurologist, but we will make him see one of our neurologists here.  The role of hepatic encephalopathy on this also is not clear.  The patient is on rifaximin and lactulose.  If cleared by Neurology for liver transplantation, then that is not going to be issue, but if he has Alzheimer's that might impact on him being listed.     2.  He also has atrial fibrillation and is on Eliquis.  He is supposed to see Cardiology. Dobutamine stress echo cancelled due to patient being in Atrial Fibrillation. Please note that the  Echocardiogram showed  global and left regional function was normal.  He is supposed to have a Lexiscan stress test, and we will await their input.     3.  He is also going to meet with our .  There was a question that he might have had a DUI in the past.  This will be clarified if this is an issue.   4.  Until he is listed,  for his surveillance for HCC and his EGDs which is scheduled for esophageal varices and his paracentesis, he will follow with Dr. Dana Nicolas and the GI team at [facility] unless he gets listed here at which time we will transfer his care here.        This was a 1 hour visit, of which we did discuss with the patient and his family about transplantation including living donor liver transplantation, as they were interested  and they are going to meet with Gabriel Whatley today to get more information on that topic.       [facility]        Seer dictated note below.      Hiram Marks MD    cc:  Ninfa Nicolas MD, [facility]

## 2019-02-12 NOTE — PROGRESS NOTES
Pt here for Lexiscan. Test, medication and side effects reviewed with patient. Lung sounds clear.  Denied caffeine use. Tolerated Lexiscan dose without any adverse reactions.  Monitored post injection and then taken back to nuclear medicine for follow up imaging.

## 2019-02-13 NOTE — PROGRESS NOTES
Outpatient MNT: Liver Transplant Evaluation    Current BMI: 24.1 (HT 72 in,  lbs/81 kg)  BMI is within criteria of <40 for liver transplant     Time Spent: 15 minutes  Visit Type: Initial  Referring Physician: Phylicia  Pt accompanied by: his wife, Uzma, daughter, Lulu, and son-in-law, Delroy     History of previous txp: none     Nutrition Assessment  Uzma cooks at home.     Appetite: good and overall improving     Vitamins, Supplements, Pertinent Meds: vit D, DHA, MVI  Herbal Medicines/Supplements: melatonin     Diet Recall  Breakfast Cereal, banana    Lunch Grazes on fruit, crackers, cookies    Dinner 8 oz roast, pork, chicken, potatoes, carrots, tuna casserole with LS soup    Snacks 3 boiled eggs HS, protein balls made by daughter    Beverages Water, sparkling water, special K shakes (but none recently d/t higher K content)    Dining out None now since end of Dec but previously would dine out 3x/week      Physical Activity  None, but uses a cane or walker to ambulate      Anthropometrics  Height:   72 in   BMI:    24.1    Weight Status:Normal BMI   Weight:  177 lbs            IBW (lb): 178  % IBW: 99    Wt Hx: No JULIANE. Has weekly paracenteses, previously 2x/week. Fluid status improving. Prior stable UBW was 230 lbs, last weighed 8 years ago. Then when pt retired he put on ~20 lbs per family. Pt appears muscle wasted (moderate degree).     Adj/dosing BW: 177 lbs/81 kg         Labs  Recent Labs   Lab Test 02/11/19  0714   CHOL 95   HDL 43   LDL 41   TRIG 56       Malnutrition  % Intake: decreased but overall improving - does not meet criteria for malnutrition   % Weight Loss: likely meets criteria for malnutrition, yet difficult to assess with fluid status/losses   Subcutaneous Fat Loss: Moderate  Muscle Loss: Moderate  Fluid Accumulation/Edema: Moderate   Malnutrition Diagnosis: Non-Severe malnutrition vs severe malnutrition in the context of acute on chronic illness    Estimated Nutrition  Needs  Energy  2025-2430     (25-30 kcal/kg for maintenance)   Protein  81-97    (1-1.2 g/kg for increased needs)           Fluid  1 ml/kcal or per MD        Micronutrient   Na+: <2000 mg/day            Nutrition Diagnosis  Food and nutrition related knowledge deficit r/t pre liver transplant eval AEB pt verbalized not hearing pre/post transplant diet guidelines.    Nutrition Intervention  Nutrition education provided:  Discussed sodium intake (low sodium foods and drinks, seasoning food without salt and tips for low sodium diet).    Reviewed adequate protein intake. Encouraged receiving protein from both animal and plant based sources. Reviewed sources of protein and provided list of kidney friendly protein supplements, as his GFR is decreased and K has been an issue for him in the past. Discussed need for protein for liver health, but would not exceed much over 100 g/day to prevent strain on his kidneys.     Reviewed post txp diet guidelines in brief (will review in further detail post txp):  (1) Review of proper food safety measures d/t immunosuppressant therapy post-op and increased risk for food-borne illness    (2) Avoid the following post txp d/t risk for rejection, unknown effects on the organs, and/or potential interactions with immunosuppressants:  - Herbal, Chinese, holistic, chiropractic, natural, alternative medicines and supplements  - Detoxes and cleanses  - Weight loss pills  - Protein powders or other products with extracts or herbs (ie green tea extract)    (3) Med regimen and possible side effects    Patient Understanding: Pt verbalized understanding of education provided.  Expected Compliance: Good  Follow-Up Plans: PRN     Nutrition Goals  1. Limit Na+ <2000mg/day  2. Pt to verbalize understanding of 3 aspects of post txp education provided  3. Aim for minimum of 81 g protein/day    Provided pt with contact info.   Maggy Virgen RD, LD  Pgr [phone]

## 2019-02-14 NOTE — PROGRESS NOTES
Liver Transplant Evaluation/Teaching    TEACHING TOPICS: Evaluation Process, Evaluation Items, Diagnostic Studies, Consultation, Chemical Dependency Policy, CD Eval, Donor Source, Liver Allocation, MELD System, UNOS, Waiting List, Follow up while on transplant list, Follow up after transplantation, Infection and Rejection, Immunosuppression , Medication Teaching, Lab Recording after transplant, Laboratory Frequency after transplant , Consent for evaluation and One year survival rates  INSTRUCTIONAL MATERIAL USED/GIVEN: Liver Transplant Handbook, MELD Booklet, Donor Booklet, Web Sites Options, Verbal instructions, Multiple Listing Brochure , Consent for evaluation signed, One year survival rates and SRTR (Scientific Registry) Data  Person(s) involved in teaching: patient, his wife, his daughter and family  Asks Questions: YES  Eager to Learn: YES  Cooperative: YES  Receptive (willing/able to accept information): YES  Reason for the appointment, diagnosis and treatment plan:YES  Knowledge of proper use of medications and conditions for which they are ordered (with special attention to potential side effects or drug interactions): YES  Which situations necessitate calling provider and whom to contact:YES  Other: NA  Teaching Concerns Addressed   Comments: Patient, his wife, his daughter and family attended transplant class. Asked excellent questions. Eval consent signed.   Proper use and care of (medical equip, care aids, etc.):YES  Nutritional needs and diet plan: YES  Pain management techniques: YES  Wound Care: YES  How and/when to access community resources: YES  Patient is aware of and agrees to required commitment to post-op care and long term follow-up: YES  Patient has name and phone number of transplant coordinator.   Time Spent face-to-face teachin minutes.  Cheri Garcia RN  Liver Transplant Coordinator

## 2019-02-18 NOTE — PROGRESS NOTES
"Psychosocial Assessment for Liver Transplant Evaluation  Gilmar \"Viet\" Burke was seen in the [facility]plant [facility] as part of his evaluation as a potential liver transplant recipient.  Viet was accompanied by his wife Uzma, daughter Dee and her  Delroy.  Living Situation: Viet is a [age] year old man from [place], [place].  He lives with his wife Uzma in a house they've lived in for thirty-five years.  Uzma sets up and administers his medications.    Education/Employment: Viet graduated high school and he attended on year of college at [employer].  He is not a [employer].  Viet worked as a  for thirty-four years until he retired at the age of [age].    Financial /Income: Viet receives Social Security shelter benefits and a pension.  His wife has income from her part-time employment at [employer].  Health Insurance:  [employer] Medicare Advantage.  I provided education regarding Part B (80%) coverage for immunosuppression medication.   This writer talked with Viet and his family about the financial risks of transplant, particularly about the high cost of transplant related medications and the importance of maintaining adequate health insurance coverage.  Family/Social Support: Viet and his wife Uzma have been  for forty-six years and their relationship is stable.  Uzma is his primary care giver, and she is in complete charge of his medications, scheduling appointments and transportation.      Lisa have two adult children.  Their daughter Dee and her  Delroy are present today and demonstrating their support of patient.  They also live in [place], [place].  Viet and Uzma's son Nuno lives in [place], [place].   Viet reports having a close relationship with both children.  Viet's parents are .    This writer stressed the importance of having a stable and involved support network before and after transplant.  Provided Viet and his family with education " "about the relationship between a stable support system and better surgical and post-transplant outcomes compared to patients with a limited support system.    Functional Status: Viet is independent with his personal cares and ambulation.  He cannot drive due to his cognitive decline.  Chemical Dependency:  Viet's family reports Viet has a history of smoking a cigar once monthly up until August of 2018.  He has no illicit drug use or pain medication abuse history.  Viet's family reports his pattern of drinking was one or two drinks per month up until he discontinued all consumption in August of 2018.  Of note, Viet did receive a DWI in August of 2018 but he family reports this was an isolated incident and one where his cognition was altered due to his liver disease.  Viet's wife and daughter report they have never been concerned about Viet's alcohol consumption.  Viet has no history of chemical dependency treatment.  Mental Health: Viet denies any mental health history.  He describes himself as \"happy go nolan.\"  Viet denies any history of suicidal ideation or hospitalization for mental health treatment.  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  His daughter reports he is ambivalent about liver transplantation.  She states, \"some days he wants to be fixed and some days he does not.\"    This writer provided Viet and his family with supportive counseling throughout this interview.  This writer also encouraged Viet and his family to attend the liver transplant support group for additional support and encouragement if he remains on the path for liver transplantation.   Impression/Recommendations:   Gilmar's wife Uzma, daughter Dee and son-in-law Delroy verbalize understanding the psychosocial risks of transplant and teaching provided during this evaluation.  Viet was a poor historian throughout this interview and was not oriented  (he reported he and his wife had been  for " "twenty-four years when it has been forty-six, he could not provide today's date, month or year).  I am very concerned about Viet's cognitive functioning and would recommend repeat neuropsychological testing.  [facility] and [facility].    Viet has not had any alcohol since August of 2018, and though he did receive a DWI at that time his family reports he was only a \"social\" drinker of one or two drinks per month.  They deny that he was ever a heavy consumer of alcohol, though [facility] report \"he drank approximately three beers per day for many years.\"   I would not recommend a chemical dependency evaluation because Viet would not be able to participate in the evaluation or treatment.  Gilmar has adequate finances and health insurance for transplant, a stable living situation and an involved support system. This writer will remain available to assist patient throughout the evaluation process and will follow patient through transplant if he is listed.  It was a pleasure to evaluate this patient for liver transplant.   Teaching completed during assessment:  1.     Housing and relocation needs post transplant.  2.     Caregiver needs post transplant.  3.     Financial issues related to transplant.  4.     Risks of alcohol use post transplant.  5.     Common psychosocial stressors pre/post transplant.          6.     Liver Transplant support group availability.          7.     [employer] Directive             Psychosocial Risks of Transplant Reviewed:  1.     Increased stress related to your emotional, family, social, employment, or   financial situation.  2.     Affect on work and/or disability benefits.  3.     Affect on future health and life insurance.  4.     Transplant outcome expectations may not be met.  5.     Mental Health risks: anxiety, depression, PTSD, guilt, grief and chronic fatigue.     Vanessa " ROJELIO ChinSW

## 2019-03-04 NOTE — PROGRESS NOTES
Pt was seen for neuropsychological evaluation at the request of Dr. Deep Garcia for the purposes of diagnostic clarification and treatment planning. 100 minutes of test administration and scoring were provided by this writer. Please see Dr. Disha Fournier's report for a full interpretation of the findings.    Je Valencia  Psychometrist

## 2019-03-07 NOTE — NURSING NOTE
Chief Complaint   Patient presents with     New Patient     [facility] NEW - PMH INCLUDES ALZHEIMER'S DEMENTIA W/O BEHAVIORAL DISTURBANCE     Ariadne Cronin

## 2019-03-07 NOTE — LETTER
3/7/2019       RE: Gilmar Turk  [address]  [facility] [address]     Dear Colleague,    Thank you for referring your patient, Gilmar Turk, to the [facility] NEUROLOGY at [facility]. Please see a copy of my visit note below.    Service Date: 2019      Hiram Marks MD   [facility]    [address] [place] [address] [address]   [place] [address]      RE: Gilmar Turk   MRN: [id number]   : 1950      Dear Dr. Marks:      Thank you for referring Gilmar Turk for neurologic consultation on 2019.  The patient is a 69-year-old man who comes with a chief complaint of memory loss.      The patient was accompanied by his wife, Uzma, as well as his daughter, Dee, and his brother-in-law, Darien.  They were all quite helpful in giving the patient's history.  The patient has no insight into his issues and does not really feel he has a problem in terms of memory.  He has developed memory loss over the last 2 years and he gets quite frustrated at times.  He had retired as a  in .  He had suffered Bell's palsy in , although it was brought up that possibly he had a stroke.  He was evaluated by Dr. Channing Harris, who is a prominent neurologist in [place].  He was placed on donepezil, but his family said that he did quite poorly with the medication and by November he had suffered weight loss over 60 pounds and became more confused and started to fall.  Once they stopped the medicine under Dr. Harris's direction all of this seemed to improve.  The patient has no other real complaints and he has not had any trouble with stroke-like symptoms other than the Bell's palsy.  He denied any further weakness or paresthesias.  He has not had new imbalance now, although he has had some trouble because of a right great toe amputation, but he has not suffered any falls and as I have indicated balance actually has improved.  He has had to be reminded to take a  shower and also to dress properly since December.  In November, he had some episodes of incontinence of urine or stool, but that has improved.  He never had to be placed in diapers.  He has no serologic risk factors.  This is his first marriage.  He has never had a transfusion.  He has never used any illicit drugs including marijuana.  He never has had what sounds like myoclonus or asterixis and his family has not really noticed any jerking, although he has had some increased dreams taking medications in November when he started to have more problems with donepezil he would scream at night during a dream, but wandered and never struck out.  He was quite frustrated in the past but [facility] gave him did seem to help.  He is inseparable from his family.  He does not drive.  He never paid the bills, but he did use a credit card until 2018 and never had a problem.  He tends to repeat himself.  The patient is hard of hearing.  He eats well.  The patient has had trouble with his right hand.  He has had in particular what sounds like some paresthesias when he has gripped a steering wheel with the right hand.  He has just lived with it and it has not been a major issue.  They reviewed his history further, and he has never had head trauma or seizures.  He and his family did tell me that his mother and had had dementia.  This was on her death certificate.  She  at 80.  They are not aware of anybody else in his family having a dementing process, but she was one of the oldest living direct relatives and most  in their late 70s.  The patient did have an episode though that they could recall when 3 years ago he fell off of a roof from scaffolding.  He fell 5 feet and he probably did strike his head.  The patient otherwise did not have any significant history of head trauma.  He was never observed to have had this happen to him, according to his brother-in-law, Darien, who was with him later.  He was found  evidently on MRI scan to have a cystic area of encephalomalacia involving the superior frontal head region.  I went over the films with them and pointed this out to them.  The patient has never had any hallucinations.      I did review with them and made sure they had a copy of his formal neuropsychologic evaluation from 03/04/2019 with Dr. Disha Fournier.  She felt that the patient with known VOGEL related cirrhosis with esophageal varices which had required esophageal banding and irregular paracentesis since late last summer.  She said prior to that he came to neurologic attention with a 5-year history of progressive cognitive decline, accelerating over the last year with diagnosis of probable Alzheimer disease, possibly with cerebrovascular disease as well.  She went on to state neuropsychologic evaluation done through the [facility] system in 04/2018 showed relatively well preserved intellectual and language abilities, but significant memory impairment and slowed processing speeds, as well as mild executive deficits.  She noted too that he had a focal area of cystic encephalomalacia in the right superior frontal gyrus and mild generalized cerebral atrophy but interestingly no hippocampal atrophy.  He was now being considered for liver transplant, which his family understands.  She went on to state that almost certainly multiple factors contribute to a markedly abnormal mental status, certainly including hepatic encephalopathy.  She earlier had stated that testing on this occasion revealed major, global cognitive impairment.  Specifically, intellectual ability is now well within the very impaired range.  He had significant dysnomia and very impaired executive abilities, including divided attention and nonverbal planning.  She went on to state that the gradual cognitive decline over a 5-year period, reported by his spouse, cannot be comfortably attributed to hepatic disease.  There has certainly been a major  "decline in the last year since he was previously tested.  This is concerning for toxic encephalopathy superimposed on a progressive primary degenerative brain disease.  She went on to state, \"As such, liver transplant is not expected to restore cognitive functioning.\"  She also stated that, \"This dementia is due to multiple medical factors, most of which are not reversible.  In this condition, Mr. Turk is not competent to make well-reasoned decisions about his medical care, or for that matter anything else.  He requires 24-hour supervision.\"      I reviewed the records from 02/12/2019 from [facility] with them.  The patient has continued on apixaban, Lipitor, vitamin D, docosahexaenoic acid, Lexapro 15 mg daily, Lasix, lactulose, Cozaar, melatonin 5 mg, Lopressor, Centrum, multivitamin, potassium chloride, rifaximin , spironolactone, sucralfate, and vitamin D3.  The patient has no listed drug allergies.      The patient had recent labs done from 02/11/2019 and he had normal electrolytes.  His ALT was 15, his AST 63 and his alkaline phosphatase 154.  His albumin was 2.7 and total protein 7.4.  His white count was 3300, hemoglobin 11 and MCV 99 with platelet count of 100.  His INR was 1.91.  The patient did carry a diagnosis of VOGEL-TOMMIE related cirrhosis.  He had other comorbidities including prediabetes, hyperlipidemia and a past history of alcohol use.  His diagnosis was thought to be combination of VOGEL-TOMMIE.  The patient had atrial fibrillation and was on Eliquis.  He also was to be evaluated here in Neurology.  It was noted if he did have Alzheimer's this  might impact him on being listed.      The patient did have records from [facility] and I did review these with them from Dr. Harris.  He was seen on different occasions but it appeared the first was on 05/15/2018.  At that time, his diagnosis was Alzheimer disease based on cognitive testing and he was to start donepezil.  He was to " have OT assessment to address driving safety.  The patient was followed and again on 08/24/2018 and at that time he had agreed to stop driving and there was an increase in his Lexapro.  The patient's family did have telephone documentation with Dr. Harris after that evidently concerning his use of donepezil.      I reviewed the records of Dr. Javier Whatley from 02/12/2019.  The patient did have a history of alcohol abuse and had a history of a DWI and lost license.  He recommended a CD evaluation.  He did go over his different diagnoses, which I reviewed with the family.      I reviewed the records from 02/12/2019 of Dr. Plaza, the cardiologist.  He had had a past history of the known liver cirrhosis, but also atrial fibrillation, chronic hypertension, diabetes with osteomyelitis not currently on hypoglycemics and Alzheimer dementia.  He had quit smoking.  He had smoked cigars, cigarillos or filtered cigars.  He never used smokeless tobacco.  He reports he did not drink alcohol or use drugs.      The patient has always had an outgoing personality, according to his family, and he really has not had any major personality change other than frustration with his issues which have been well treated with Lexapro.      The patient had further laboratory tests done that I reviewed with them.  On 02/11/2019, his TSH was 5.96 but his free T4 was normal at 0.85.  His ammonia level was 30 on 02/04/2019.  He had a copper level done that appeared to be normal for 24-hour urine and on 01/14/2019 a B12 level was done that was 911 picograms.  On 02/01/2018, he had a thiamine level of 116.      Neurologic examination revealed a pleasant man.  He was jocular.  He knew his family's names in the room.  He knew he was in an office but could not tell me where other than [place].  He knew it was a medical office.  He thought it was spring time.  He then was oriented that there was snow on the ground and he could tell me there had been  [place] and that Karla was probably coming up.  He could tell me 3 states that touch [place].  He could not recall any of 3 objects after 5 minutes.  The patient had no recollection of recent world events and could not tell me the president.  He could draw a clock correctly to indicate 4:30.  He had a very minimal tremor on clock drawing and also minimal postural tremor but he had no asterixis or myoclonus.  Otherwise, gait, station, cerebellar testing, muscle stretch reflexes that showed a decreased right triceps and brachioradialis reflex with brisk knee jerks and absent ankle jerks, toes that are probably downward going to plantar stimulation with an amputated right great toe, but also negative Shaddock sign.  He had normal strength.  The patient had on cranial nerve examination no obvious frontal release.  He had normal extraocular movements and full visual fields to confrontation.  His disks were flat.  He has synkinesis involving the left side of his face which I pointed out to his family and in retrospect they had noticed it.  I told them this is typical for Bell's palsy.  Otherwise, cortical sensory testing was normal.  This included vibratory and position sense testing.  I could not auscultate cervical bruits.  He had an irregular/regular precordial rhythm.  He did not have any murmurs.      IMPRESSION:   1.  Alzheimer disease.   2.  Prior history of presumed head trauma related to a fall from a scaffold with frontal polar area of encephalomalacia on the right.   3.  Liver failure.      The patient has multiple causes for his progressive issues.  He was actually much worse until a month or two ago.  His family blamed this on donepezil.  He has been under excellent neurologic care.  I reviewed all of these different records with them at length.  I think that he does have underlying dementia.  He more than likely does have other additional factors causing encephalopathy as indicated by Dr. Fournier.  I  suspect that his prognosis is guarded and that his neurologic condition will deteriorate further.  I have recommended he continue to have very careful follow up with Dr. Harris.  I did suggest that an EEG be done to help rule out nonconvulsive seizures.        Sincerely yours,       Dieter Diane MD      I spent 70 minutes with the patient and his family today.  Over 50% of the time this involved counseling and coordination of care.  A complete review of medical systems was done and the positive review is listed in the report above.      cc:   [place] [address]      Channing Harris MD   [facility]    [address] [place] [address]      D: 2019   T: 2019   MT: AKA      Name:     LAW HEARN   MRN:      [id number]        Account:      [id number]   :      1950           Service Date: 2019      Document: [id number]
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
## 2019-03-11 NOTE — PROGRESS NOTES
WECHSLER ADULT INTELLIGENCE SCALE-IV CONTROLLED WORD ASSOCIATION TEST                           Standard Score     Vocabulary         4     Score  4    Digit Span       1        Block Design       2     PORTEUS MAZE TEST   Coding       1          Test Age      7    WECHSLER MEMORY SCALES  Test Quotient         46        Logical Mem Immediate  2/2  TRAIL MAKING TEST   Logical Mem 30 Minute  0/0     Visual Repr Immediate    1    Time Errors   Visual Repr 30     0    A    116    0    30 Minute Recognition    2    B   dc d           TEST OF SUSTAINED ATTENTION & TRACKING PSYCHOMOTOR TESTS        Total Time     429    Right Left    Total Errors    40  Finger Tapping    29      24     Grooved Pegboard  273    274     [place] NAMING TEST                                        Drops: 0              Drops: 0        Score  48

## 2019-03-13 NOTE — TELEPHONE ENCOUNTER
Patient admitted for [facility] for albumin challenge in hopes of saving his kidneys.     If challenge does not work [facility] should be calling to see if transfer is warranted to make final txp decision.     Will discuss at selection conference next week, or sooner if warranted.

## 2019-03-18 NOTE — PROGRESS NOTES
Service Date: 2019      Hiram Marks MD   [facility]    [address] [facility]   [place] [address]      RE: Gilmar Turk   MRN: [id number]   : 1950      Dear Dr. Marks:      Thank you for referring Gilmar Turk for neurologic consultation on 2019.  The patient is a 69-year-old man who comes with a chief complaint of memory loss.      The patient was accompanied by his wife, Uzma, as well as his daughter, Dee, and his brother-in-law, Darien.  They were all quite helpful in giving the patient's history.  The patient has no insight into his issues and does not really feel he has a problem in terms of memory.  He has developed memory loss over the last 2 years and he gets quite frustrated at times.  He had retired as a  in .  He had suffered Bell's palsy in , although it was brought up that possibly he had a stroke.  He was evaluated by Dr. Channing Harris, who is a prominent neurologist in [place].  He was placed on donepezil, but his family said that he did quite poorly with the medication and by November he had suffered weight loss over 60 pounds and became more confused and started to fall.  Once they stopped the medicine under Dr. Harris's direction all of this seemed to improve.  The patient has no other real complaints and he has not had any trouble with stroke-like symptoms other than the Bell's palsy.  He denied any further weakness or paresthesias.  He has not had new imbalance now, although he has had some trouble because of a right great toe amputation, but he has not suffered any falls and as I have indicated balance actually has improved.  He has had to be reminded to take a shower and also to dress properly since December.  In November, he had some episodes of incontinence of urine or stool, but that has improved.  He never had to be placed in diapers.  He has no serologic risk factors.  This is his first marriage.  He has never had a  transfusion.  He has never used any illicit drugs including marijuana.  He never has had what sounds like myoclonus or asterixis and his family has not really noticed any jerking, although he has had some increased dreams taking medications in November when he started to have more problems with donepezil he would scream at night during a dream, but wandered and never struck out.  He was quite frustrated in the past but [facility] Dr. Harris gave him did seem to help.  He is inseparable from his family.  He does not drive.  He never paid the bills, but he did use a credit card until 2018 and never had a problem.  He tends to repeat himself.  The patient is hard of hearing.  He eats well.  The patient has had trouble with his right hand.  He has had in particular what sounds like some paresthesias when he has gripped a steering wheel with the right hand.  He has just lived with it and it has not been a major issue.  They reviewed his history further, and he has never had head trauma or seizures.  He and his family did tell me that his mother and had had dementia.  This was on her death certificate.  She  at 80.  They are not aware of anybody else in his family having a dementing process, but she was one of the oldest living direct relatives and most  in their late 70s.  The patient did have an episode though that they could recall when 3 years ago he fell off of a roof from scaffolding.  He fell 5 feet and he probably did strike his head.  The patient otherwise did not have any significant history of head trauma.  He was never observed to have had this happen to him, according to his brother-in-law, Darien, who was with him later.  He was found evidently on MRI scan to have a cystic area of encephalomalacia involving the superior frontal head region.  I went over the films with them and pointed this out to them.  The patient has never had any hallucinations.      I did review with them and made sure they had a  "copy of his formal neuropsychologic evaluation from 03/04/2019 with Dr. Disha Fournier.  She felt that the patient with known VOGEL related cirrhosis with esophageal varices which had required esophageal banding and irregular paracentesis since late last summer.  She said prior to that he came to neurologic attention with a 5-year history of progressive cognitive decline, accelerating over the last year with diagnosis of probable Alzheimer disease, possibly with cerebrovascular disease as well.  She went on to state neuropsychologic evaluation done through the [facility] system in 04/2018 showed relatively well preserved intellectual and language abilities, but significant memory impairment and slowed processing speeds, as well as mild executive deficits.  She noted too that he had a focal area of cystic encephalomalacia in the right superior frontal gyrus and mild generalized cerebral atrophy but interestingly no hippocampal atrophy.  He was now being considered for liver transplant, which his family understands.  She went on to state that almost certainly multiple factors contribute to a markedly abnormal mental status, certainly including hepatic encephalopathy.  She earlier had stated that testing on this occasion revealed major, global cognitive impairment.  Specifically, intellectual ability is now well within the very impaired range.  He had significant dysnomia and very impaired executive abilities, including divided attention and nonverbal planning.  She went on to state that the gradual cognitive decline over a 5-year period, reported by his spouse, cannot be comfortably attributed to hepatic disease.  There has certainly been a major decline in the last year since he was previously tested.  This is concerning for toxic encephalopathy superimposed on a progressive primary degenerative brain disease.  She went on to state, \"As such, liver transplant is not expected to restore cognitive functioning.\"  She " "also stated that, \"This dementia is due to multiple medical factors, most of which are not reversible.  In this condition, Mr. Turk is not competent to make well-reasoned decisions about his medical care, or for that matter anything else.  He requires 24-hour supervision.\"      I reviewed the records from 02/12/2019 from [facility] with them.  The patient has continued on apixaban, Lipitor, vitamin D, docosahexaenoic acid, Lexapro 15 mg daily, Lasix, lactulose, Cozaar, melatonin 5 mg, Lopressor, Centrum, multivitamin, potassium chloride, rifaximin , spironolactone, sucralfate, and vitamin D3.  The patient has no listed drug allergies.      The patient had recent labs done from 02/11/2019 and he had normal electrolytes.  His ALT was 15, his AST 63 and his alkaline phosphatase 154.  His albumin was 2.7 and total protein 7.4.  His white count was 3300, hemoglobin 11 and MCV 99 with platelet count of 100.  His INR was 1.91.  The patient did carry a diagnosis of VOGEL-TOMMIE related cirrhosis.  He had other comorbidities including prediabetes, hyperlipidemia and a past history of alcohol use.  His diagnosis was thought to be combination of VOGEL-TOMMIE.  The patient had atrial fibrillation and was on Eliquis.  He also was to be evaluated here in Neurology.  It was noted if he did have Alzheimer's this  might impact him on being listed.      The patient did have records from [facility] and I did review these with them from Dr. Harris.  He was seen on different occasions but it appeared the first was on 05/15/2018.  At that time, his diagnosis was Alzheimer disease based on cognitive testing and he was to start donepezil.  He was to have OT assessment to address driving safety.  The patient was followed and again on 08/24/2018 and at that time he had agreed to stop driving and there was an increase in his Lexapro.  The patient's family did have telephone documentation with Dr. Harris after that " evidently concerning his use of donepezil.      I reviewed the records of Dr. Javier Whatley from 02/12/2019.  The patient did have a history of alcohol abuse and had a history of a DWI and lost license.  He recommended a CD evaluation.  He did go over his different diagnoses, which I reviewed with the family.      I reviewed the records from 02/12/2019 of Dr. Plaza, the cardiologist.  He had had a past history of the known liver cirrhosis, but also atrial fibrillation, chronic hypertension, diabetes with osteomyelitis not currently on hypoglycemics and Alzheimer dementia.  He had quit smoking.  He had smoked cigars, cigarillos or filtered cigars.  He never used smokeless tobacco.  He reports he did not drink alcohol or use drugs.      The patient has always had an outgoing personality, according to his family, and he really has not had any major personality change other than frustration with his issues which have been well treated with Lexapro.      The patient had further laboratory tests done that I reviewed with them.  On 02/11/2019, his TSH was 5.96 but his free T4 was normal at 0.85.  His ammonia level was 30 on 02/04/2019.  He had a copper level done that appeared to be normal for 24-hour urine and on 01/14/2019 a B12 level was done that was 911 picograms.  On 02/01/2018, he had a thiamine level of 116.      Neurologic examination revealed a pleasant man.  He was jocular.  He knew his family's names in the room.  He knew he was in an office but could not tell me where other than [place].  He knew it was a medical office.  He thought it was spring time.  He then was oriented that there was snow on the ground and he could tell me there had been [place] and that Easter was probably coming up.  He could tell me 3 states that touch [place].  He could not recall any of 3 objects after 5 minutes.  The patient had no recollection of recent world events and could not tell me the president.  He could draw a clock  correctly to indicate 4:30.  He had a very minimal tremor on clock drawing and also minimal postural tremor but he had no asterixis or myoclonus.  Otherwise, gait, station, cerebellar testing, muscle stretch reflexes that showed a decreased right triceps and brachioradialis reflex with brisk knee jerks and absent ankle jerks, toes that are probably downward going to plantar stimulation with an amputated right great toe, but also negative Shaddock sign.  He had normal strength.  The patient had on cranial nerve examination no obvious frontal release.  He had normal extraocular movements and full visual fields to confrontation.  His disks were flat.  He has synkinesis involving the left side of his face which I pointed out to his family and in retrospect they had noticed it.  I told them this is typical for Bell's palsy.  Otherwise, cortical sensory testing was normal.  This included vibratory and position sense testing.  I could not auscultate cervical bruits.  He had an irregular/regular precordial rhythm.  He did not have any murmurs.      IMPRESSION:   1.  Alzheimer disease.   2.  Prior history of presumed head trauma related to a fall from a scaffold with frontal polar area of encephalomalacia on the right.   3.  Liver failure.      The patient has multiple causes for his progressive issues.  He was actually much worse until a month or two ago.  His family blamed this on donepezil.  He has been under excellent neurologic care.  I reviewed all of these different records with them at length.  I think that he does have underlying dementia.  He more than likely does have other additional factors causing encephalopathy as indicated by Dr. Fournier.  I suspect that his prognosis is guarded and that his neurologic condition will deteriorate further.  I have recommended he continue to have very careful follow up with Dr. Harris.  I did suggest that an EEG be done to help rule out nonconvulsive seizures.        Sincerely  yours,       Nano Diane MD      I spent 70 minutes with the patient and his family today.  Over 50% of the time this involved counseling and coordination of care.  A complete review of medical systems was done and the positive review is listed in the report above.      cc:   [place] [address]      Channing Harris MD   [facility] [address] [place] [address]         NANO DIANE MD             D: 2019   T: 2019   MT: AKA      Name:     LAW HEARN   MRN:      [id number]        Account:      [id number]   :      1950           Service Date: 2019      Document: [id number]

## 2019-03-25 NOTE — PROGRESS NOTES
Sent daughter, Romy information about living liver donation.  I asked that she call me and I could talk to her more about donation.

## 2019-03-26 NOTE — LETTER
April 2, 2019    Gilmar Turk  [address]  [place] [address]    Dear Mr. Turk,   The purpose of this letter is to let you know that on 3/26/2019 the [facility] Multi-Disciplinary Selection Team reviewed the results of your transplant evaluation.  Based on the results of your evaluation and the selection criteria used by our program , the decision was made to not list you on the liver transplant list.  This is because of your neurological condition, along with your current and most likely future kidney issues, which all together makes possible risks outweigh the possible benefits of going forward with transplantation.   Important things you should know:    If you would like to discuss the decision, or if your medical status changes you may schedule a return visits with your doctor by calling [phone] and asking to speak to your transplant coordinator.    We recommend that you continue to follow up with your primary care doctor in order to manage your health concerns.  Enclosed is a letter from [employer] which describes the services offered to patients by [facility] and the [facility] Trans[facility] Network.  Thank you for allowing us to participate in your care.  We wish you well.  Sincerely,    Yuri Eddy Jr., BSN, RN  Liver Transplant Coordinator  [phone]    Solid Organ Transplant  [facility]    Enclosures: OS Letter  cc: Care Team

## 2019-03-26 NOTE — COMMITTEE REVIEW
Abdominal Committee Review Note     Evaluation Date: 2/12/2019  Committee Review Date: 3/26/2019    Organ being evaluated for: Liver    Transplant Phase: Evaluation  Transplant Status: Active    Transplant Coordinator: Yuri Eddy Jr.  Transplant Surgeon:   Javier Whatley    Referring Physician: Ninfa Nicolas    Primary Diagnosis: Cirrhosis: Fatty Liver (Luis)  Secondary Diagnosis:     Committee Review Members:  Nutrition Maggy Virgen, RD   Pharmacy Adiel Li, [facility]    - Clinical Vanessa Chin, JI, Naima Ann, [facility]   Transplant Virginia Keene, RN, Johanna Ramos, ARTUR, Deep Garcia MD, Jr Yuri Eddy, RN, Liza Terrazas, APRN CNP, Manda Patterson, ARTUR, Hiram Marks MD, Thierry James MD, Cheri Garcia, ARTUR, Brennen Palma MD, Thomas M. Leventhal, MD, Joseph Abreu MD       Transplant Eligibility: Cirrhosis with MELD, LUIS    Committee Review Decision: Declined    Relative Contraindications: None    Absolute Contraindications: Other, mental capacity given neuropsych & neurology consuolt    Committee Chair Leventhal, Thomas Michael, MD verbally attested to the committee's decision.    Committee Discussion Details:     Close evaluation - denied due to mental capacity that would not improve with liver transplantation.

## 2020-07-17 NOTE — TELEPHONE ENCOUNTER
Sonia left a message asking us to find another neuropsych appointment for Viet. She states that he's scheduled on 3/4, but she would like to know what other patient there are. She asks that you leave the dates and times in a message on her VM if you can't reach her directly.     Patient

## 2021-06-01 VITALS — WEIGHT: 229 LBS | BODY MASS INDEX: 31.02 KG/M2 | HEIGHT: 72 IN

## 2021-06-02 VITALS — HEIGHT: 72 IN | WEIGHT: 208.1 LBS | BODY MASS INDEX: 28.19 KG/M2

## 2021-06-02 VITALS — BODY MASS INDEX: 27.77 KG/M2 | HEIGHT: 72 IN | WEIGHT: 205 LBS

## 2021-06-02 VITALS — BODY MASS INDEX: 27.8 KG/M2 | WEIGHT: 205 LBS

## 2021-06-02 VITALS — WEIGHT: 207.5 LBS | BODY MASS INDEX: 28.1 KG/M2 | HEIGHT: 72 IN

## 2021-06-19 NOTE — LETTER
Letter by Byron Garcia MD at      Author: Byron Gracia MD Service: -- Author Type: --    Filed:  Encounter Date: 3/20/2019 Status: (Other)         Gilmar Turk  [address]  [place] [address]      March 20, 2019      Dear Gilmar,    This letter is to remind you that you will be due for your follow up appointment with Dr. Byorn Garcia  . To help ensure you are in the best health possible, a regular follow-up with your cardiologist is essential.     Please call our Patient Scheduling Line at [phone] to schedule your appointment at your earliest convenience.  If you have recently scheduled an appointment, please disregard this letter.    We look forward to seeing you again. As always, we are available at the number  above for any questions or concerns you may have.      Sincerely,     The Physicians and Staff of [facility] [place]
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
## 2021-06-20 NOTE — PROGRESS NOTES
Patient arrived via wheelchair with his wife for Daptomycin infusion. PICC line remains patent with blood return confirmed. Tolerated Daptomycin with no complaints. PICC line flushed per routine and green swab cap placed at end of PICC line. Patient discharged to home with his wife ambulatory to return tomorrow am as scheduled.

## 2021-06-20 NOTE — ANESTHESIA CARE TRANSFER NOTE
Last vitals:   Vitals:    08/28/18 1712   BP: 123/72   Pulse: 92   Resp: 18   Temp: 37.1  C (98.8  F)   SpO2: 100%     Patient's level of consciousness is drowsy  Spontaneous respirations: yes  Maintains airway independently: yes  Dentition unchanged: yes  Oropharynx: oropharynx clear of all foreign objects    QCDR Measures:  ASA# 20 - Surgical Safety Checklist: WHO surgical safety checklist completed prior to induction  PQRS# 430 - Adult PONV Prevention: 4558F-8P - Pt did NOT receive => 2 anti-emetic agents  ASA# 8 - Peds PONV Prevention: NA - Not pediatric patient, not GA or 2 or more risk factors NOT present  PQRS# 424 - Milena-op Temp Management: 4559F - At least one body temp DOCUMENTED => 35.5C or 95.9F within required timeframe  PQRS# 426 - PACU Transfer Protocol: - Transfer of care checklist used  ASA# 14 - Acute Post-op Pain: ASA14B - Patient did NOT experience pain >= 7 out of 10

## 2021-06-20 NOTE — PROGRESS NOTES
"[facility]      Code Status:  FULL CODE  Visit Type: H & P     Facility:  [facility] [[id number]]           History of Present Illness: Gilmar Turk is a 68 y.o. male who is currently admitted to the TCU.  Patient is a 68 years old male with history of Alzheimer dementia, A. fib on Eliquis, hypertension, non-insulin-dependent diabetes mellitus with recent discharge from hospital with right toe wound with osteomyelitis on IV daptomycin per ID.  He was readmitted by podiatrist for revision partial first ray amputation of right foot.  Apparently he had wound dehiscence after hallux amputation of his right foot with osteomyelitis of his right foot and underwent a partial first ray amputation of his right foot on 9/17/18.   Rapid response was called for syncope and monitor on telemetry.  No arrhythmia noted on telemetry.  Blood pressure improved with no orthostatic hypotension.   Psychiatry was also asked to evaluate him for intermittent confusion with underlying history of depression and history of Alzheimer's dementia.  He has been started on Aricept and Lexapro.  He is currently denying any pain.  He does not want to remain nonweightbearing on his right foot and told me he has been trying to put some weight on it though he was advised not to.  He is pushing for an early discharge home  Past Medical History:   Diagnosis Date     Chronic kidney disease      Cognitive impairment 12/9/2016    Sees neurologist, Dr. Harris, at [facility].     Diabetes mellitus (H)      Hypertension      MRSA (methicillin resistant staph aureus) culture positive      Permanent atrial fibrillation (H) 4/12/2018    Found incidentally in December 2016 Asymptomatic and on rate control. [id number] score of 2 for age 65-74 and hypertension -recommended aspirin until insurance coverage and then recommend blood thinner Atrial fib still present on Holter April, 2018, thus now \"permanent\"     " Renal mass, right      Past Surgical History:   Procedure Laterality Date     FOOT AMPUTATION Right 8/28/2018    Procedure: AMPUTATION FIRST RAY RIGHT FOOT;  Surgeon: Irina Yeung DPM;  Location: [facility];  Service:      INCISION AND DRAINAGE OF WOUND Right 8/28/2018    Procedure: INCISION AND DRAINAGE, RIGHT FOOT;  Surgeon: Irina Yeung DPM;  Location: [facility] OR;  Service:      PICC  9/1/2018          DC AMPUTATION METATARSAL+TOE,SINGLE Right 9/17/2018    Procedure: REVISION 1ST RAY AMPUTATION RIGHT FOOT;  Surgeon: Irina Yeung DPM;  Location: [facility];  Service: Podiatry     Family History   Problem Relation Age of Onset     No Medical Problems Son      No Medical Problems Daughter      Dementia Mother      No Medical Problems Sister      No Medical Problems Sister      No Medical Problems Sister      No Medical Problems Half Sister      Sudden death Neg Hx      Acute Myocardial Infarction Neg Hx      Stroke Neg Hx      Social History     Social History     Marital status:      Spouse name: Uzma     Number of children: 2     Years of education: N/A     Occupational History     / Retired     Social History Main Topics     Smoking status: Former Smoker     Types: Cigars     Quit date: 6/30/2018     Smokeless tobacco: Never Used      Comment: cigars     Alcohol use 16.8 oz/week     28 Cans of beer per week      Comment: quit December, 2016     Drug use: No     Sexual activity: No     Other Topics Concern     Not on file     Social History Narrative     Current Outpatient Prescriptions   Medication Sig Dispense Refill     apixaban (ELIQUIS) 5 mg Tab tablet Take 5 mg by mouth 2 (two) times a day.       atorvastatin (LIPITOR) 10 MG tablet Take 10 mg by mouth daily.       cholecalciferol, vitamin D3, 2,000 unit Tab Take 2,000 Units by mouth daily.       DAPTOmycin (CUBICIN) Infuse 550 mg into a venous catheter daily for 24 days.  9900 mg 0     donepezil (ARICEPT) 10 MG tablet Take 10 mg by mouth daily.        escitalopram oxalate (LEXAPRO) 10 MG tablet Take 15 mg by mouth daily.        melatonin 3 mg Tab tablet Take 1 tablet (3 mg total) by mouth at bedtime for 7 days.  0     metoprolol tartrate (LOPRESSOR) 25 MG tablet Take 25 mg by mouth 2 (two) times a day.       multivitamin (MEN'S MULTI-VITAMIN) per tablet Take 1 tablet by mouth daily.       omega-3/dha/epa/fish oil (FISH OIL-OMEGA-3 FATTY ACIDS) 300-1,000 mg capsule Take 2 g by mouth daily.       No current facility-administered medications for this visit.      No Known Allergies      Review of Systems:    Constitutional: Negative.  Negative for fever, chills, has activity change, appetite change and fatigue.   He has not been eating very well blood sugars however been stable under 200  HENT: Negative for congestion and facial swelling.    Eyes: Negative for photophobia, redness and visual disturbance.   Respiratory: Negative for cough and chest tightness.    Cardiovascular: Negative for chest pain, palpitations and leg swelling.   Gastrointestinal: Negative for nausea, diarrhea, constipation, blood in stool and abdominal distention.   Genitourinary: Negative.    Musculoskeletal: Negative.    Right lower extremity is in a surgical boot.  Missing right great toe  Skin: Negative.    Neurological: Negative for dizziness, tremors, syncope, weakness, light-headedness and headaches.   Hematological: Does not bruise/bleed easily.   Psychiatric/Behavioral: Negative.    Confusion with a poor recall  Blood pressure 124/82 temp 98 and pulse 67    Physical Exam:    GENERAL: no acute distress. Cooperative in conversation.   HEENT: pupils are equal, round and reactive. Oral mucosa is moist and intact.  RESP:Chest symmetric. Regular respiratory rate. No stridor.  CVS: S1S2  ABD: Nondistended, soft.  EXTREMITIES: No lower extremity edema.  Right foot is in a surgical boot.  Intact dressing over his  "right foot with a missing great toe  NEURO: non focal. Alert and oriented x3.  Some confusion with recall issues  PSYCH: within normal limits. No depression or anxiety.  SKIN: warm dry intact     Labs:    Recent Results (from the past 240 hour(s))   POCT Glucose   Result Value Ref Range    Glucose, POC 94 mg/dL   Surgical Pathology Exam   Result Value Ref Range    Case Report       Surgical Pathology                                Case: [id number]                                    Authorizing Provider:  Irina Yeung,   Collected:           09/17/2018 1328                                     DPM                                                                          Ordering Location:     [facility] OR     Received:            09/17/2018 1411              Pathologist:           Olga Blank MD                                                          Specimen:    Foot, Right, First Metatarsal Right Foot                                                   Final Diagnosis       FIRST METATARSAL, RIGHT FOOT, FIRST RAY AMPUTATION REVISION:     1) PERIOSTEAL AND INTRAMEDUALLARY GRANULATION TISSUE WITH BONE REMODELING, CONSISTENT        WITH HEALING SURGICAL/FRACTURE SITE    2) SEROUS ATROPHY OF BONE MARROW WITHOUT ACUTE OR CHRONIC OSTEOMYELITIS     3) UNREMARKABLE TRANSECTION MARGIN    Microscopic Description       Microscopic examination performed, substantiating the above diagnosis.    Clinical Information       Pre-op Diagnosis:  Diabetic foot ulcer (H) [E11.621, L97.509]    Gross Description       Received in formalin, labeled with the patient's name and \"right first metatarsal,\" is a distally bulbous, 2.2 cm in length, 2.1 cm in external diameter distal portion of metatarsal. The specimen has a smooth transection margin, which is inked black. The opposing bulbous and has a tan-pink to red, granular articular cartilage and attached soft tissue. Sectioning displays a tan-yellow to pink, congested " and trabeculated cut surface. No abscess formations or gross tumor nodules are identified. RS-2C (decal)      Note: Full-length longitudinal sections are submitted, following fixation and subsequent overnight decalcification. Haseeb Donnelly CPT: 02444, 74707  ICD-10: M86.171     Result Flag  Normal   POCT Glucose   Result Value Ref Range    Glucose, POC 80 mg/dL   POCT Glucose   Result Value Ref Range    Glucose, POC 94 mg/dL   POCT Glucose   Result Value Ref Range    Glucose,  mg/dL   HM2(CBC W/O DIFF)   Result Value Ref Range    WBC 4.4 4.0 - 11.0 thou/uL    RBC 3.64 (L) 4.40 - 6.20 mill/uL    Hemoglobin 12.1 (L) 14.0 - 18.0 g/dL    Hematocrit 36.2 (L) 40.0 - 54.0 %     80 - 100 fL    MCH 33.2 27.0 - 34.0 pg    MCHC 33.4 32.0 - 36.0 g/dL    RDW 14.0 11.0 - 14.5 %    Platelets 92 (L) 140 - 440 thou/uL    MPV 10.9 8.5 - 12.5 fL   Comprehensive Metabolic Panel   Result Value Ref Range    Sodium 140 136 - 145 mmol/L    Potassium 4.2 3.5 - 5.0 mmol/L    Chloride 105 98 - 107 mmol/L    CO2 28 22 - 31 mmol/L    Anion Gap, Calculation 7 5 - 18 mmol/L    Glucose 108 70 - 125 mg/dL    BUN 13 8 - 22 mg/dL    Creatinine 1.86 (H) 0.70 - 1.30 mg/dL    GFR MDRD Af Amer 44 (L) >60 mL/min/1.73m2    GFR MDRD Non Af Amer 36 (L) >60 mL/min/1.73m2    Bilirubin, Total 1.4 (H) 0.0 - 1.0 mg/dL    Calcium 8.6 8.5 - 10.5 mg/dL    Protein, Total 8.1 (H) 6.0 - 8.0 g/dL    Albumin 2.5 (L) 3.5 - 5.0 g/dL    Alkaline Phosphatase 131 (H) 45 - 120 U/L    AST 59 (H) 0 - 40 U/L    ALT 38 0 - 45 U/L   POCT Glucose   Result Value Ref Range    Glucose,  mg/dL   HM2(CBC w/o Differential)   Result Value Ref Range    WBC 4.6 4.0 - 11.0 thou/uL    RBC 3.10 (L) 4.40 - 6.20 mill/uL    Hemoglobin 10.4 (L) 14.0 - 18.0 g/dL    Hematocrit 30.2 (L) 40.0 - 54.0 %    MCV 97 80 - 100 fL    MCH 33.5 27.0 - 34.0 pg    MCHC 34.4 32.0 - 36.0 g/dL    RDW 13.8 11.0 - 14.5 %    Platelets 76 (L) 140 - 440 thou/uL    MPV 11.3 8.5 - 12.5 fL   Basic  Metabolic Panel   Result Value Ref Range    Sodium 139 136 - 145 mmol/L    Potassium 4.1 3.5 - 5.0 mmol/L    Chloride 107 98 - 107 mmol/L    CO2 26 22 - 31 mmol/L    Anion Gap, Calculation 6 5 - 18 mmol/L    Glucose 100 70 - 125 mg/dL    Calcium 8.0 (L) 8.5 - 10.5 mg/dL    BUN 14 8 - 22 mg/dL    Creatinine 1.76 (H) 0.70 - 1.30 mg/dL    GFR MDRD Af Amer 47 (L) >60 mL/min/1.73m2    GFR MDRD Non Af Amer 39 (L) >60 mL/min/1.73m2   CK Total   Result Value Ref Range    CK, Total 51 30 - 190 U/L   POCT Glucose   Result Value Ref Range    Glucose, POC 86 mg/dL   POCT Glucose   Result Value Ref Range    Glucose,  mg/dL   ECG 12 lead MUSE   Result Value Ref Range    SYSTOLIC BLOOD PRESSURE  mmHg    DIASTOLIC BLOOD PRESSURE  mmHg    VENTRICULAR RATE 90 BPM    ATRIAL RATE  BPM    P-R INTERVAL  ms    QRS DURATION 146 ms    Q-T INTERVAL 428 ms    QTC CALCULATION (BEZET) 523 ms    P Axis  degrees    R AXIS 127 degrees    T AXIS -11 degrees    MUSE DIAGNOSIS       Atrial fibrillation  Right bundle branch block  Left posterior fascicular block  ** Bifascicular block **  Abnormal ECG  When compared with ECG of 01-SEP-2018 09:37,  T wave inversion now evident in Anterior leads  Confirmed by ADONIS HUANG MD LOC:JN ([id number]) on 9/18/2018 4:44:31 PM     POCT Glucose   Result Value Ref Range    Glucose,  mg/dL   Troponin I   Result Value Ref Range    Troponin I <0.01 0.00 - 0.29 ng/mL   Basic Metabolic Panel   Result Value Ref Range    Sodium 138 136 - 145 mmol/L    Potassium 3.9 3.5 - 5.0 mmol/L    Chloride 106 98 - 107 mmol/L    CO2 25 22 - 31 mmol/L    Anion Gap, Calculation 7 5 - 18 mmol/L    Glucose 115 70 - 125 mg/dL    Calcium 8.2 (L) 8.5 - 10.5 mg/dL    BUN 14 8 - 22 mg/dL    Creatinine 1.93 (H) 0.70 - 1.30 mg/dL    GFR MDRD Af Amer 42 (L) >60 mL/min/1.73m2    GFR MDRD Non Af Amer 35 (L) >60 mL/min/1.73m2   Magnesium   Result Value Ref Range    Magnesium 1.1 (L) 1.8 - 2.6 mg/dL   Phosphorus   Result Value Ref Range     Phosphorus 3.5 2.5 - 4.5 mg/dL   HM1 (CBC with Diff)   Result Value Ref Range    WBC 5.2 4.0 - 11.0 thou/uL    RBC 3.17 (L) 4.40 - 6.20 mill/uL    Hemoglobin 10.4 (L) 14.0 - 18.0 g/dL    Hematocrit 31.1 (L) 40.0 - 54.0 %    MCV 98 80 - 100 fL    MCH 32.8 27.0 - 34.0 pg    MCHC 33.4 32.0 - 36.0 g/dL    RDW 13.7 11.0 - 14.5 %    Platelets 85 (L) 140 - 440 thou/uL    MPV 11.4 8.5 - 12.5 fL    Neutrophils % 72 (H) 50 - 70 %    Lymphocytes % 15 (L) 20 - 40 %    Monocytes % 8 2 - 10 %    Eosinophils % 4 0 - 6 %    Basophils % 2 0 - 2 %    Neutrophils Absolute 3.7 2.0 - 7.7 thou/uL    Lymphocytes Absolute 0.8 0.8 - 4.4 thou/uL    Monocytes Absolute 0.4 0.0 - 0.9 thou/uL    Eosinophils Absolute 0.2 0.0 - 0.4 thou/uL    Basophils Absolute 0.1 0.0 - 0.2 thou/uL   POCT Glucose   Result Value Ref Range    Glucose,  mg/dL   POCT Glucose   Result Value Ref Range    Glucose,  mg/dL   Magnesium   Result Value Ref Range    Magnesium 2.4 1.8 - 2.6 mg/dL   Comprehensive Metabolic Panel   Result Value Ref Range    Sodium 136 136 - 145 mmol/L    Potassium 3.8 3.5 - 5.0 mmol/L    Chloride 106 98 - 107 mmol/L    CO2 26 22 - 31 mmol/L    Anion Gap, Calculation 4 (L) 5 - 18 mmol/L    Glucose 85 70 - 125 mg/dL    BUN 16 8 - 22 mg/dL    Creatinine 1.77 (H) 0.70 - 1.30 mg/dL    GFR MDRD Af Amer 47 (L) >60 mL/min/1.73m2    GFR MDRD Non Af Amer 39 (L) >60 mL/min/1.73m2    Bilirubin, Total 1.1 (H) 0.0 - 1.0 mg/dL    Calcium 7.9 (L) 8.5 - 10.5 mg/dL    Protein, Total 6.3 6.0 - 8.0 g/dL    Albumin 1.9 (L) 3.5 - 5.0 g/dL    Alkaline Phosphatase 101 45 - 120 U/L    AST 38 0 - 40 U/L    ALT 28 0 - 45 U/L   Troponin I   Result Value Ref Range    Troponin I <0.01 0.00 - 0.29 ng/mL   HM1 (CBC with Diff)   Result Value Ref Range    WBC 4.1 4.0 - 11.0 thou/uL    RBC 2.90 (L) 4.40 - 6.20 mill/uL    Hemoglobin 9.7 (L) 14.0 - 18.0 g/dL    Hematocrit 28.7 (L) 40.0 - 54.0 %    MCV 99 80 - 100 fL    MCH 33.4 27.0 - 34.0 pg    MCHC 33.8 32.0 -  36.0 g/dL    RDW 13.9 11.0 - 14.5 %    Platelets 71 (L) 140 - 440 thou/uL    MPV 11.8 8.5 - 12.5 fL    Neutrophils % 52 50 - 70 %    Lymphocytes % 31 20 - 40 %    Monocytes % 9 2 - 10 %    Eosinophils % 7 (H) 0 - 6 %    Basophils % 2 0 - 2 %    Neutrophils Absolute 2.1 2.0 - 7.7 thou/uL    Lymphocytes Absolute 1.3 0.8 - 4.4 thou/uL    Monocytes Absolute 0.4 0.0 - 0.9 thou/uL    Eosinophils Absolute 0.3 0.0 - 0.4 thou/uL    Basophils Absolute 0.1 0.0 - 0.2 thou/uL   POCT Glucose   Result Value Ref Range    Glucose, POC 73 mg/dL   POCT Glucose   Result Value Ref Range    Glucose,  mg/dL   POCT Glucose   Result Value Ref Range    Glucose,  mg/dL   POCT Glucose   Result Value Ref Range    Glucose,  mg/dL   Magnesium   Result Value Ref Range    Magnesium 1.8 1.8 - 2.6 mg/dL   Renal Function Profile   Result Value Ref Range    Albumin 2.0 (L) 3.5 - 5.0 g/dL    Calcium 8.0 (L) 8.5 - 10.5 mg/dL    Phosphorus 3.0 2.5 - 4.5 mg/dL    Glucose 83 70 - 125 mg/dL    BUN 15 8 - 22 mg/dL    Creatinine 1.74 (H) 0.70 - 1.30 mg/dL    Sodium 137 136 - 145 mmol/L    Potassium 3.9 3.5 - 5.0 mmol/L    Chloride 106 98 - 107 mmol/L    CO2 25 22 - 31 mmol/L    Anion Gap, Calculation 6 5 - 18 mmol/L    GFR MDRD Af Amer 48 (L) >60 mL/min/1.73m2    GFR MDRD Non Af Amer 39 (L) >60 mL/min/1.73m2   HM1 (CBC with Diff)   Result Value Ref Range    WBC 4.3 4.0 - 11.0 thou/uL    RBC 3.03 (L) 4.40 - 6.20 mill/uL    Hemoglobin 10.1 (L) 14.0 - 18.0 g/dL    Hematocrit 29.6 (L) 40.0 - 54.0 %    MCV 98 80 - 100 fL    MCH 33.3 27.0 - 34.0 pg    MCHC 34.1 32.0 - 36.0 g/dL    RDW 14.0 11.0 - 14.5 %    Platelets 82 (L) 140 - 440 thou/uL    MPV 11.5 8.5 - 12.5 fL    Neutrophils % 50 50 - 70 %    Lymphocytes % 31 20 - 40 %    Monocytes % 10 2 - 10 %    Eosinophils % 8 (H) 0 - 6 %    Basophils % 1 0 - 2 %    Neutrophils Absolute 2.1 2.0 - 7.7 thou/uL    Lymphocytes Absolute 1.3 0.8 - 4.4 thou/uL    Monocytes Absolute 0.4 0.0 - 0.9 thou/uL     Eosinophils Absolute 0.3 0.0 - 0.4 thou/uL    Basophils Absolute 0.1 0.0 - 0.2 thou/uL   POCT Glucose   Result Value Ref Range    Glucose, POC 78 mg/dL   POCT Glucose   Result Value Ref Range    Glucose, POC 85 mg/dL   CK Total   Result Value Ref Range    CK, Total 73 30 - 190 U/L   Comprehensive Metabolic Panel   Result Value Ref Range    Sodium 139 136 - 145 mmol/L    Potassium 4.0 3.5 - 5.0 mmol/L    Chloride 107 98 - 107 mmol/L    CO2 27 22 - 31 mmol/L    Anion Gap, Calculation 5 5 - 18 mmol/L    Glucose 87 70 - 125 mg/dL    BUN 15 8 - 22 mg/dL    Creatinine 1.90 (H) 0.70 - 1.30 mg/dL    GFR MDRD Af Amer 43 (L) >60 mL/min/1.73m2    GFR MDRD Non Af Amer 36 (L) >60 mL/min/1.73m2    Bilirubin, Total 0.9 0.0 - 1.0 mg/dL    Calcium 8.5 8.5 - 10.5 mg/dL    Protein, Total 6.6 6.0 - 8.0 g/dL    Albumin 2.0 (L) 3.5 - 5.0 g/dL    Alkaline Phosphatase 107 45 - 120 U/L    AST 46 (H) 0 - 40 U/L    ALT 28 0 - 45 U/L   C-Reactive Protein (CRP)   Result Value Ref Range    CRP 2.5 (H) 0.0 - 0.8 mg/dL   Magnesium   Result Value Ref Range    Magnesium 1.8 1.8 - 2.6 mg/dL   Phosphorus   Result Value Ref Range    Phosphorus 3.7 2.5 - 4.5 mg/dL   Sedimentation Rate   Result Value Ref Range    Sed Rate 41 (H) 0 - 15 mm/hr   HM1 (CBC with Diff)   Result Value Ref Range    WBC 3.5 (L) 4.0 - 11.0 thou/uL    RBC 3.04 (L) 4.40 - 6.20 mill/uL    Hemoglobin 10.3 (L) 14.0 - 18.0 g/dL    Hematocrit 29.2 (L) 40.0 - 54.0 %    MCV 96 80 - 100 fL    MCH 33.9 27.0 - 34.0 pg    MCHC 35.3 32.0 - 36.0 g/dL    RDW 13.9 11.0 - 14.5 %    Platelets 87 (L) 140 - 440 thou/uL    MPV 11.2 8.5 - 12.5 fL    Neutrophils % 47 (L) 50 - 70 %    Lymphocytes % 34 20 - 40 %    Monocytes % 9 2 - 10 %    Eosinophils % 9 (H) 0 - 6 %    Basophils % 1 0 - 2 %    Neutrophils Absolute 1.6 (L) 2.0 - 7.7 thou/uL    Lymphocytes Absolute 1.2 0.8 - 4.4 thou/uL    Monocytes Absolute 0.3 0.0 - 0.9 thou/uL    Eosinophils Absolute 0.3 0.0 - 0.4 thou/uL    Basophils Absolute 0.1 0.0 -  0.2 thou/uL     Xr Chest 1 View    Result Date: 9/12/2018  XR CHEST 1 VIEW 9/12/2018 3:31 PM INDICATION: Check picc line position, has migrated out 4-5 cm. COMPARISON: 09/01/2018. FINDINGS: Compared to the previous study the PICC has pulled back slightly. The tip remains within the SVC.    Xr Toe Right 2 Or More Vws    Result Date: 8/27/2018  XR TOE RIGHT 2 OR MORE VWS 8/27/2018 6:25 PM INDICATION: Swelling redness, suspected osteomyelitis COMPARISON: None. FINDINGS: Destructive changes and periosteal reaction involving the distal phalanx of the great toe consistent with osteomyelitis in the appropriate clinical setting. Severe degenerative changes first MTP joint. Small plantar heel spur. Vascular calcifications. NOTE: ABNORMAL REPORT THE DICTATION ABOVE DESCRIBES AN ABNORMALITY FOR WHICH FOLLOW-UP IS NEEDED.     Us Kidney Bilateral    Result Date: 9/1/2018  US KIDNEY BILATERAL WITH BLADDER 9/1/2018 12:25 PM INDICATION: Renal failure TECHNIQUE: Routine kidneys and bladder. COMPARISON: None. FINDINGS: Right kidney measures 13.1 x 6.0 x 5.5  cm. No hydronephrosis. A 1.4 x 1.5 x 1.7 cm hyperechoic lesion is noted in the medial middle third of the right kidney. Normal renal echotexture. Left kidney measures 12.8 x 6.2 x 4.9  cm. No hydronephrosis. No mass. Normal renal echotexture. Bladder is normal. Ascites is present.     CONCLUSION: 1.  Likely normal renal echotexture. 2.  No hydronephrosis. 3.  Normal urinary bladder. 4.  A 1.7 cm hyperechoic lesion in the right kidney may represent an angiomyolipoma, though malignancy is not excluded. Recommend renal mass protocol CT or MRI for further evaluation. 5.  Ascites. NOTE: ABNORMAL REPORT THE DICTATION ABOVE DESCRIBES AN ABNORMALITY FOR WHICH FOLLOW-UP IS NEEDED.      Xr Chest 1 View For Picc Placement Portable    Result Date: 9/1/2018  XR CHEST 1 VIEW FOR PICC LINE PLACEMENT PORTABLE 9/1/2018 4:27 PM INDICATION: Check PICC placement. COMPARISON: None. FINDINGS:  Right-sided PICC tip in good position in low SVC. Heart size and vascularity are normal. Shallow inspiration with no acute infiltrate, pneumothorax nor pleural effusion.    Assessment/Plan:   Osteomyelitis right foot s/p revision 1st ray amputation.  Done as an elective procedure 9/17/18 secondary to osteomyelitis of the right foot with wound dehiscence status post hallux amputation of the right foot   - NWB right foot, PT/OT following   - Dressing to remain intact.  Syncope: Telemetry does not show any arrhythmia.    Advised to monitor blood pressures in the TCU.  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  ID was managing antibiotics.  Continue daptomycin as per schedule.    He will have weekly labs to be done as ordered by infectious disease.  He at present is scheduled for 6 weeks of IV antibiotic therapy.  Seems to be tolerating antibiotics well  Last CRP was 2.5     Acute kidney injury: Check creatinine in the TCU was 1.9 with a GFR of 36     Diabetes type 2: He is diet controlled and is on no chronic medications for this he currently has insulin sliding scale with Accu-Cheks as blood sugars have been stable again he is not eating very well either     Permanent A. fib: Rate well controlled.  Continue with his metoprolol and Eliquis for anti-Coagulation     Acute blood loss anemia: Secondary  To surgery.  Closely monitor hemoglobin.  Recheck hemoglobin was 10.3     Thrombocytopenia: Monitor.  Felt to be possibly related to daptomycin and he may need a change in antibiotics if the platelet counts continue to drop  Recheck count is 80 7K will fax labs to infectious disease and await their recommendations     Hyperlipidemia:  onStatin.     Alzheimer dementia: on Aricept.  Due to confusion concerns he was seen by psychiatry in the hospital and at the request he remains on Aricept 10 mg daily  as well as Lexapro 15 mg continue to monitor mood and behaviors they recommended also monitoring any insomnia issues with him in scheduling melatonin or trazodone if he does not sleep well    Debilitation he has been discharged to the TCU for strengthening and rehab he is nonweightbearing remains pleasantly confused and wants to discharge home ASAP we will monitor progress in therapy and discussed with family regarding IV antibiotics before initiating a discharge plan  Labs do indicate some worsening renal function associated with that he is getting low platelets as well as low white count fax the labs have been faxed to infectious disease will await the recommendation about continuing the antibiotics for now    Total time spent was 45 minutes, more than half of it was in face-to-face counseling regarding disease state, treatment, side effects, documentation, review of clinical data and coordination of care    Electronically signed by: MIGUE Navarrete  This progress note was completed using Dragon software and there may be grammatical errors.

## 2021-06-20 NOTE — ANESTHESIA PREPROCEDURE EVALUATION
Anesthesia Evaluation      Patient summary reviewed     Airway   Mallampati: II  Neck ROM: full   Pulmonary - normal exam    breath sounds clear to auscultation  (+) decreased breath sounds, a smoker                         Cardiovascular   (+) hypertension, dysrhythmias, ,     Rhythm: regular  Rate: normal,         Neuro/Psych    (+) dementia,     Comments: ETOH abuse  Cognitive impairment  Acute encephalopathy    Endo/Other    (+) diabetes mellitus,      Comments: MRSA positive  osteomyelitis    GI/Hepatic/Renal    (+)   chronic renal disease,      Other findings: Labs wnl's but for elevated cr.., h/o of encephalopathy, - shortness of breath..      Dental - normal exam                        Anesthesia Plan  Planned anesthetic: MAC    ASA 3     Anesthetic plan and risks discussed with: patient    Post-op plan: routine recovery
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
## 2021-06-20 NOTE — PROGRESS NOTES
Per provider's note 9/27, PICC line pulled today after last dose daptomycin. Pt tolerated well. Pressure held to PICC site approx 10 min, wrapped in Coban for continued pressure. Pt's spouse, Uzma, given dc instructions including whom and when to call. Pt wheeled out of clinic.

## 2021-06-20 NOTE — PROGRESS NOTES
Pt arrived ambulatory to clinic for every other day IVP Daptomycin.  PICC line flushed easily with excellent blood return.  Administered IVP antibiotics per MD order.  Pt tolerated infusion well, no s/s of infusion reaction.  PICC line was flushed with NS then wrapped for tuesdays use. Pt verbalized understanding of plan of care and return to clinic.

## 2021-06-20 NOTE — PROGRESS NOTES
Patient arrived via wheelchair for Daptomycin. PICC line remains patent with blood return confirmed/flushed easily with 0.9NaCl. Patient discharged to home via wheelchair with his wife.

## 2021-06-20 NOTE — PROGRESS NOTES
Gilmar Turk, 68 y.o., male arrived at Infusion Therapy at 1300 for infusion of Daptomycin. Plan of care reviewed with patient and wife. Patient and wife verbalized understanding and had no questions or concerns. PICC line flushed well with Normal Saline and had great blood return. PICC dressing and cap changed today. PICC line out 7cm, was 2cm at insertion. Great blood return before and after dressing change. Physician notified and chest x-ray ordered to check placement.      PIV inserted in R hand to administer Daptomycin. Patient tolerated well. No labs drawn because they were drawn yesterday during his clinic appointment. X-Ray results show PICC tip remains within SVC. Patient and wife educated to be very cautious and careful with PICC line, as we do not want it to migrate out any further.     Gilmar Turk taken to Radiology for Chest X-ray at 1510. Discharged from Infusion Therapy at 1555 alert, oriented and in stable condition in wheelchair with his wife. Declined AVS. Will return Friday 9/14/18 for next infusion.

## 2021-06-20 NOTE — ANESTHESIA POSTPROCEDURE EVALUATION
Patient: Gilmar Turk  REVISION 1ST RAY AMPUTATION RIGHT FOOT  Anesthesia type: MAC    Patient location: PT  Vitals:    09/17/18 1525   BP: 116/70   Pulse: 85   Resp: 16   Temp: 36.4  C (97.5  F)   SpO2: 96%     Post vital signs: stable  Level of consciousness: awake and responds to simple questions  Post-anesthesia pain: pain controlled  Post-anesthesia nausea and vomiting: no  Pulmonary: unassisted, return to baseline  Cardiovascular: stable and blood pressure at baseline  Hydration: adequate  Anesthetic events: no    QCDR Measures:  ASA# 11 - Milena-op Cardiac Arrest: ASA11B - Patient did NOT experience unanticipated cardiac arrest  ASA# 12 - Milena-op Mortality Rate: ASA12B - Patient did NOT die  ASA# 13 - PACU Re-Intubation Rate: NA - No ETT / LMA used for case  ASA# 10 - Composite Anes Safety: ASA10A - No serious adverse event    Additional Notes:   yes

## 2021-06-20 NOTE — PROGRESS NOTES
Patient ambulated into Infusion Care with the use of a Cam boot on his foot. PICC line flushed with excellent blood return.Patient tolerated IV push of Daptomycin without any problems. New swabcap and stockingette applied. All questions answered and patient was discharged home.

## 2021-06-20 NOTE — ANESTHESIA CARE TRANSFER NOTE
Last vitals:   Vitals:    09/17/18 1349   BP: 112/77   Pulse: 78   Resp: 16   Temp: 36.6  C (97.9  F)   SpO2: 100%     Patient's level of consciousness is drowsy  Spontaneous respirations: yes  Maintains airway independently: yes  Dentition unchanged: yes  Oropharynx: oropharynx clear of all foreign objects    QCDR Measures:  ASA# 20 - Surgical Safety Checklist: WHO surgical safety checklist completed prior to induction  PQRS# 430 - Adult PONV Prevention: 4558F - Pt received => 2 anti-emetic agents (different classes) preop & intraop  ASA# 8 - Peds PONV Prevention: NA - Not pediatric patient, not GA or 2 or more risk factors NOT present  PQRS# 424 - Milena-op Temp Management: 4559F - At least one body temp DOCUMENTED => 35.5C or 95.9F within required timeframe  PQRS# 426 - PACU Transfer Protocol: - Transfer of care checklist used  ASA# 14 - Acute Post-op Pain: ASA14B - Patient did NOT experience pain >= 7 out of 10   No

## 2021-06-20 NOTE — ANESTHESIA POSTPROCEDURE EVALUATION
Patient: Gilmar Turk  INCISION AND DRAINAGE, RIGHT FOOT, AMPUTATION FIRST RAY RIGHT FOOT, Sesamoid Excision  Anesthesia type: general    Patient location: PACU  Last vitals:   Vitals:    08/28/18 1809   BP: 109/60   Pulse: 86   Resp: 24   Temp: 37.2  C (98.9  F)   SpO2: 99%     Post vital signs: stable  Level of consciousness: awake and responds to simple questions  Post-anesthesia pain: pain controlled  Post-anesthesia nausea and vomiting: no  Pulmonary: unassisted, return to baseline  Cardiovascular: stable and blood pressure at baseline  Hydration: adequate  Anesthetic events: no    QCDR Measures:  ASA# 11 - Milena-op Cardiac Arrest: ASA11B - Patient did NOT experience unanticipated cardiac arrest  ASA# 12 - Milena-op Mortality Rate: ASA12B - Patient did NOT die  ASA# 13 - PACU Re-Intubation Rate: ASA13B - Patient did NOT require a new airway mgmt  ASA# 10 - Composite Anes Safety: ASA10A - No serious adverse event    Additional Notes:

## 2021-06-20 NOTE — PROGRESS NOTES
Gilmar Turk, 68 y.o., male arrived at Infusion Therapy at 1245 for infusion of Daptomycin. Plan of care reviewed with patient and wife. Education given, patient and wife verbalized understanding and had no questions or concerns. PICC line flushed well with Normal Saline and had great blood return. Wife stated patient does have onset of Alzheimer's disease and therefore, has mild confusion. Patient and wife given AVS and reviewed future appointments.    Gilmar Turk discharged at 1330 in stable condition in wheelchair with his wife. He will be seen Saturday at [facility] Infusion therapy for next dose.

## 2021-06-20 NOTE — PROGRESS NOTES
Pt wheeled into clinic for IV abx. Discussed use of prophylactic probiotics. Pt tolerated abx infusion well. Pt wheeled out of clinic.

## 2021-06-20 NOTE — PROGRESS NOTES
Pt presented to infusion center for antibiotics for bactremia mrsa after toe amputation. Pt was in  [facility] but home now and coming in for every 2 day antibiotic with picc cares. Pt picc dressing changed last on 9/19 per sticker on his arm and it was taped several times. Pt drsg changed with cap change and new antiseptic cap today. Labs also drawn and should be sent to Infectious disease, Dr Gomez. Pt discharged with his wife via wheelchair and tolerated antibiotics and picc drsg change well. Pt will return on Thursday. Judy Bishop RN. Dr gomez talked with pharmacy and pt dose changed to daily. Front called pt to schedule daily. Judy Bishop RN

## 2021-06-20 NOTE — PROGRESS NOTES
[facility]      Code Status:  FULL CODE  Visit Type: Review Of Multiple Medical Conditions     Facility:  [facility] [[id number]]           History of Present Illness: Gilmar Turk is a 68 y.o. male who is currently admitted to the TCU.  Patient is a 68 years old male with history of Alzheimer dementia, A. fib on Eliquis, hypertension, non-insulin-dependent diabetes mellitus with recent discharge from hospital with right toe wound with osteomyelitis on IV daptomycin per ID.  He was readmitted by podiatrist for revision partial first ray amputation of right foot.  Apparently he had wound dehiscence after hallux amputation of his right foot with osteomyelitis of his right foot and underwent a partial first ray amputation of his right foot on 9/17/18.   Rapid response was called for syncope and monitor on telemetry.  No arrhythmia noted on telemetry.  Blood pressure improved with no orthostatic hypotension.   Psychiatry was also asked to evaluate him for intermittent confusion with underlying history of depression and history of Alzheimer's dementia.  He has been started on Aricept and Lexapro.  He is currently denying any pain.  He does not want to remain nonweightbearing on his right foot and told me he has been trying to put some weight on it though he was advised not to.    Mood appears to be depressed and he told me that he is upset that he cannot go home his wife apparently has made a decision not to take him home.  He was seen by orthopedics and currently has been given a cast  Past Medical History:   Diagnosis Date     Chronic kidney disease      Cognitive impairment 12/9/2016    Sees neurologist, Dr. Harris, at [facility].     Diabetes mellitus (H)      Hypertension      MRSA (methicillin resistant staph aureus) culture positive      Permanent atrial fibrillation (H) 4/12/2018    Found incidentally in December 2016 Asymptomatic and on rate control.  "[id number] score of 2 for age 65-74 and hypertension -recommended aspirin until insurance coverage and then recommend blood thinner Atrial fib still present on Holter April, 2018, thus now \"permanent\"     Renal mass, right      Past Surgical History:   Procedure Laterality Date     FOOT AMPUTATION Right 8/28/2018    Procedure: AMPUTATION FIRST RAY RIGHT FOOT;  Surgeon: Irina Yeung DPM;  [facility];  Service:      INCISION AND DRAINAGE OF WOUND Right 8/28/2018    Procedure: INCISION AND DRAINAGE, RIGHT FOOT;  Surgeon: Irina Yeung DPM;  Location: [facility] OR;  Service:      PICC  9/1/2018          ME AMPUTATION METATARSAL+TOE,SINGLE Right 9/17/2018    Procedure: REVISION 1ST RAY AMPUTATION RIGHT FOOT;  Surgeon: Irina Yeung DPM;  [facility];  Service: Podiatry     Family History   Problem Relation Age of Onset     No Medical Problems Son      No Medical Problems Daughter      Dementia Mother      No Medical Problems Sister      No Medical Problems Sister      No Medical Problems Sister      No Medical Problems Half Sister      Sudden death Neg Hx      Acute Myocardial Infarction Neg Hx      Stroke Neg Hx      Social History     Social History     Marital status:      Spouse name: Uzma     Number of children: 2     Years of education: N/A     Occupational History     / Retired     Social History Main Topics     Smoking status: Former Smoker     Types: Cigars     Quit date: 6/30/2018     Smokeless tobacco: Never Used      Comment: cigars     Alcohol use 16.8 oz/week     28 Cans of beer per week      Comment: quit December, 2016     Drug use: No     Sexual activity: No     Other Topics Concern     Not on file     Social History Narrative     Current Outpatient Prescriptions   Medication Sig Dispense Refill     apixaban (ELIQUIS) 5 mg Tab tablet Take 5 mg by mouth 2 (two) times a day.       atorvastatin (LIPITOR) 10 " MG tablet Take 10 mg by mouth daily.       cholecalciferol, vitamin D3, 2,000 unit Tab Take 2,000 Units by mouth daily.       DAPTOmycin (CUBICIN) Infuse 550 mg into a venous catheter daily for 24 days. 9900 mg 0     donepezil (ARICEPT) 10 MG tablet Take 10 mg by mouth daily.        escitalopram oxalate (LEXAPRO) 10 MG tablet Take 15 mg by mouth daily.        melatonin 3 mg Tab tablet Take 1 tablet (3 mg total) by mouth at bedtime for 7 days.  0     metoprolol tartrate (LOPRESSOR) 25 MG tablet Take 25 mg by mouth 2 (two) times a day.       multivitamin (MEN'S MULTI-VITAMIN) per tablet Take 1 tablet by mouth daily.       omega-3/dha/epa/fish oil (FISH OIL-OMEGA-3 FATTY ACIDS) 300-1,000 mg capsule Take 2 g by mouth daily.       No current facility-administered medications for this visit.      No Known Allergies      Review of Systems:    Constitutional: Negative.  Negative for fever, chills, has activity change, appetite change and fatigue.   He has not been eating very well blood sugars however been stable under 200  HENT: Negative for congestion and facial swelling.    Eyes: Negative for photophobia, redness and visual disturbance.   Respiratory: Negative for cough and chest tightness.    Cardiovascular: Negative for chest pain, palpitations and leg swelling.   Gastrointestinal: Negative for nausea, diarrhea, constipation, blood in stool and abdominal distention.   Genitourinary: Negative.    Musculoskeletal: Negative.    Right lower extremity is in a surgical boot.  Missing right great toe  Skin: Negative.    Neurological: Negative for dizziness, tremors, syncope, weakness, light-headedness and headaches.   Hematological: Does not bruise/bleed easily.   Psychiatric/Behavioral: Negative.    Confusion with a poor recall  Blood pressure 124/82 temp 98 and pulse 67    Physical Exam:    GENERAL: no acute distress. Cooperative in conversation.   HEENT: pupils are equal, round and reactive. Oral mucosa is moist and  intact.  RESP:Chest symmetric. Regular respiratory rate. No stridor.  CVS: S1S2  ABD: Nondistended, soft.  EXTREMITIES: No lower extremity edema.  Right foot is in    A CAST .  NEURO: non focal. Alert and oriented x3.  Some confusion with recall issues  PSYCH: within normal limits. No depression or anxiety.  SKIN: warm dry intact     Labs:        Assessment/Plan:   Osteomyelitis right foot s/p revision 1st ray amputation.  Done as an elective procedure 9/17/18 secondary to osteomyelitis of the right foot with wound dehiscence status post hallux amputation of the right foot   - NWB right foot, seen by orthopedics and has been given a cast.  Syncope: Telemetry did not show any arrhythmia.    Advised to monitor blood pressures in the TCU.  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  ID was managing antibiotics.  Continue daptomycin as per schedule.    He will have weekly labs to be done as ordered by infectious disease.  He at present is scheduled for 6 weeks of IV antibiotic therapy.  Seems to be tolerating antibiotics well  Last CRP was 2.5     Acute kidney injury: Check creatinine in the TCU was 1.9 with a GFR of 36     Diabetes type 2: He is diet controlled and is on no chronic medications for this he currently has insulin sliding scale with Accu-Cheks   Blood sugars are mostly under control with occasional highs greater than 200 again he is eating food mostly from outside.  Intake remains erratic so we will monitor trends before adjusting medications     Permanent A. fib: Rate well controlled.  Continue with his metoprolol and Eliquis for anti-Coagulation     Acute blood loss anemia: Secondary  To surgery.  Closely monitor hemoglobin.  Recheck hemoglobin was 10.3     Thrombocytopenia: Monitor.  Felt to be possibly related to daptomycin and he may need a change in antibiotics if the platelet  counts continue to drop  Recheck count is 80 7K will fax labs to infectious disease and await their recommendations     Hyperlipidemia:  onStatin.     Alzheimer dementia: on Aricept.  Due to confusion concerns he was seen by psychiatry in the hospital and at the request he remains on Aricept 10 mg daily as well as Lexapro 15 mg continue to monitor mood and behaviors they recommended also monitoring any insomnia issues with him in scheduling melatonin or trazodone if he does not sleep well    Debilitation he has been discharged to the TCU for strengthening and rehab he is nonweightbearing remains pleasantly confused and wants to discharge home ASAP we will monitor progress in therapy and discussed with family regarding IV antibiotics before initiating a discharge plan  Labs do indicate some worsening renal function associated with that he is getting low platelets as well as low white count fax the labs have been faxed to infectious disease will await the recommendation about continuing the antibiotics for now    Total time spent was 35 minutes, more than half of it was in face-to-face counseling regarding disease state, treatment, side effects, documentation, review of clinical data and coordination of care    Electronically signed by: MIGUE Navarrete  This progress note was completed using Dragon software and there may be grammatical errors.

## 2021-06-20 NOTE — PROGRESS NOTES
Gilmar Turk, 68 y.o., male arrived A&O, ambulatory with a walker at [facility] for infusion of Daptomycin every other day. Plan of care reviewed with patient and wife. Education given, patient and wife verbalized understanding and had no questions or concerns. PICC line flushed well with Normal Saline and had great blood return. Wife stated patient does have onset of Alzheimer's disease and therefore, has mild confusion.  daptomycin infused over 3min as ordered; line flushed and pt monitored post infusion.   1420 Gilmar HERRERA Burke discharged A&Ox4 ambulatory with walker and stable condition. He will be seen Sunday at [facility] for next dose.

## 2021-06-26 NOTE — PROGRESS NOTES
Progress Notes by Channing Samayoa NP at 10/1/2018  7:05 PM     Author: Channing Samayoa NP Service: -- Author Type: Nurse Practitioner    Filed: 10/2/2018  8:56 AM Encounter Date: 10/1/2018 Status: Attested    : Channing Samayoa NP (Nurse Practitioner) Cosigner: Shahnaz Lozada MBBS at 10/2/2018  5:27 PM    Attestation signed by Shahnaz Lozada MBBS at 10/2/2018  5:27 PM    Agree with discharge plan                [facility]      Code Status:  FULL CODE  Visit Type: Discharge Summary     Facility:  [facility] [[id number]]           History of Present Illness: Gilmar Turk is a 68 y.o. male who is currently admitted to the TCU is a recent transfer from [facility] with an admission on 9/17/18 and discharge on 9/20/18.  Patient is a 68 years old male with history of Alzheimer dementia, A. fib on Eliquis, hypertension, non-insulin-dependent diabetes mellitus with recent discharge from hospital with right toe wound with osteomyelitis on IV daptomycin per ID.  He was readmitted by podiatrist for revision partial first ray amputation of right foot.  Apparently he had wound dehiscence after hallux amputation of his right foot with osteomyelitis of his right foot and underwent a partial first ray amputation of his right foot on 9/17/18. There a rapid response was called for syncope and monitor on telemetry.  No arrhythmia noted on telemetry.  Blood pressure improved with no orthostatic hypotension. Psychiatry was also asked to evaluate him for intermittent confusion with underlying history of depression and history of Alzheimer's dementia.  He has been started on Aricept and Lexapro. He is currently denying any pain.  He does not want to remain nonweightbearing on his right foot. Mood appears to be depressed and recently saw psych. He was seen by orthopedics and currently has been given a cast and discharged to the TCU.     Today he has limited concerns  "other than wanting to go home. He understands he will f/u with podiatry/ID as directed. He will be discharged to home with services.   Past Medical History:   Diagnosis Date   ? Chronic kidney disease    ? Cognitive impairment 12/9/2016    Sees neurologist, Dr. Harris, at [facility].   ? Diabetes mellitus (H)    ? Hypertension    ? MRSA (methicillin resistant staph aureus) culture positive    ? Permanent atrial fibrillation (H) 4/12/2018    Found incidentally in December 2016 Asymptomatic and on rate control. [id number] score of 2 for age 65-74 and hypertension-recommended aspirin until insurance coverage and then recommend blood thinner Atrial fib still present on Holter April, 2018, thus now \"permanent\"   ? Renal mass, right      Past Surgical History:   Procedure Laterality Date   ? FOOT AMPUTATION Right 8/28/2018    Procedure: AMPUTATION FIRST RAY RIGHT FOOT;  Surgeon: Irina Yeung DPM;  Location: [facility];  Service:    ? INCISION AND DRAINAGE OF WOUND Right 8/28/2018    Procedure: INCISION AND DRAINAGE, RIGHT FOOT;  Surgeon: Irina Yeung DPM;  Location: [facility] OR;  Service:    ? PICC  9/1/2018        ? [place] AMPUTATION METATARSAL+TOE,SINGLE Right 9/17/2018    Procedure: REVISION 1ST RAY AMPUTATION RIGHT FOOT;  Surgeon: Irina Yeung DPM;  Location: [facility] OR;  Service: Podiatry     Family History   Problem Relation Age of Onset   ? No Medical Problems Son    ? No Medical Problems Daughter    ? Dementia Mother    ? No Medical Problems Sister    ? No Medical Problems Sister    ? No Medical Problems Sister    ? No Medical Problems Half Sister    ? Sudden death Neg Hx    ? Acute Myocardial Infarction Neg Hx    ? Stroke Neg Hx      Social History     Social History   ? Marital status:      Spouse name: Uzma   ? Number of children: 2   ? Years of education: N/A     Occupational History   ? / Retired     Social History Main " Topics   ? Smoking status: Former Smoker     Types: Cigars     Quit date: 6/30/2018   ? Smokeless tobacco: Never Used      Comment: cigars   ? Alcohol use 16.8 oz/week     28 Cans of beer per week      Comment: quit December, 2016   ? Drug use: No   ? Sexual activity: No     Other Topics Concern   ? Not on file     Social History Narrative     Current Outpatient Prescriptions   Medication Sig Dispense Refill   ? apixaban (ELIQUIS) 5 mg Tab tablet Take 5 mg by mouth 2 (two) times a day.     ? atorvastatin (LIPITOR) 10 MG tablet Take 10 mg by mouth daily.     ? cholecalciferol, vitamin D3, 2,000 unit Tab Take 2,000 Units by mouth daily.     ? DAPTOmycin (CUBICIN) Infuse 550 mg into a venous catheter daily for 24 days. 9900 mg 0   ? donepezil (ARICEPT) 10 MG tablet Take 10 mg by mouth daily.      ? escitalopram oxalate (LEXAPRO) 10 MG tablet Take 15 mg by mouth daily.      ? metoprolol tartrate (LOPRESSOR) 25 MG tablet Take 25 mg by mouth 2 (two) times a day.     ? multivitamin (MEN'S MULTI-VITAMIN) per tablet Take 1 tablet by mouth daily.     ? omega-3/dha/epa/fish oil (FISH OIL-OMEGA-3 FATTY ACIDS) 300-1,000 mg capsule Take 2 g by mouth daily.       No current facility-administered medications for this visit.      No Known Allergies      Review of Systems:    Constitutional: Negative.  Negative for fever, chills, has activity change, appetite change and fatigue.   He has not been eating very well blood sugars however been stable under 200  HENT: Negative for congestion and facial swelling.    Eyes: Negative for photophobia, redness and visual disturbance.   Respiratory: Negative for cough and chest tightness.    Cardiovascular: Negative for chest pain, palpitations and leg swelling.   Gastrointestinal: Negative for nausea, diarrhea, constipation, blood in stool and abdominal distention.   Genitourinary: Negative.    Musculoskeletal: Negative.    Right lower extremity is in a surgical boot.  Missing right great  toe.  Skin: Negative.    Neurological: Negative for dizziness, tremors, syncope, weakness, light-headedness and headaches.   Hematological: Does not bruise/bleed easily.   Psychiatric/Behavioral: Negative.    Confusion with a poor recall.      Vitals:    10/01/18 1906   BP: 142/75   Pulse: 70   Resp: 16   Temp: 97  F (36.1  C)   SpO2: 94%       Physical Exam:    GENERAL: no acute distress. Cooperative in conversation.   HEENT: pupils are equal, round and reactive. Oral mucosa is moist and intact.  RESP:Chest symmetric. Regular respiratory rate. No stridor. Dim, RA.   CVS: RRR, S1S2  ABD: Nondistended, soft. Denies constipation or diarrhea.  EXTREMITIES: No lower extremity edema.  Right foot is in a cast.  NEURO: non focal. Alert and oriented x3.  Some confusion with recall issues.  PSYCH: within normal limits. No depression or anxiety.  SKIN: warm dry intact     Labs:    Results for orders placed or performed during the hospital encounter of 09/17/18   Basic Metabolic Panel   Result Value Ref Range    Sodium 138 136 - 145 mmol/L    Potassium 3.9 3.5 - 5.0 mmol/L    Chloride 106 98 - 107 mmol/L    CO2 25 22 - 31 mmol/L    Anion Gap, Calculation 7 5 - 18 mmol/L    Glucose 115 70 - 125 mg/dL    Calcium 8.2 (L) 8.5 - 10.5 mg/dL    BUN 14 8 - 22 mg/dL    Creatinine 1.93 (H) 0.70 - 1.30 mg/dL    GFR MDRD Af Amer 42 (L) >60 mL/min/1.73m2    GFR MDRD Non Af Amer 35 (L) >60 mL/min/1.73m2     Lab Results   Component Value Date    WBC 6.0 09/28/2018    HGB 10.8 (L) 09/28/2018    HCT 31.0 (L) 09/28/2018    MCV 96 09/28/2018     (L) 09/28/2018     Lab Results   Component Value Date    CRP 11.7 (H) 09/28/2018         Assessment/Plan:   Osteomyelitis right foot s/p revision 1st ray amputation:  Done as an elective procedure 9/17/18 secondary to osteomyelitis of the right foot with wound dehiscence status post hallux amputation of the right foot with NWB, seen by orthopedics and has been given a cast. F/u with ortho as  directed.    Syncope: Telemetry did not show any arrhythmia. He did not have any repeat episode of syncope.     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  ID was managing antibiotics.  Continue daptomycin as per schedule.  He will have weekly labs to be done as ordered by infectious disease.  He at present is scheduled for 6 weeks of IV antibiotic therapy.   Seems to be tolerating antibiotics well.  Last CRP was 11.7.      Acute kidney injury: Check creatinine in the TCU was 1.93 with a GFR of 35.  F/u with PCP.      Diabetes type 2: He is diet controlled and is on no chronic medications for this he currently has insulin sliding scale with Accu-Checks. Blood sugars are mostly under control with occasional highs greater than 200 again, he is eating food mostly from outside.  Intake remains erratic. F/u with PCP.     Permanent A. fib: Rate well controlled.  Continue with his metoprolol and Eliquis for anticoagulation.     Acute blood loss anemia: s/p to surgery.  Closely monitor hemoglobin.  Recheck hemoglobin was 10.8.      Thrombocytopenia: Monitor.  Felt to be possibly related to daptomycin and he may need a change in antibiotics if the platelet counts continue to drop.  Recheck count is 111K will fax labs to infectious disease and await their recommendations.      Hyperlipidemia: continue with statin.     Alzheimer dementia: Due to confusion concerns he was seen by psychiatry in the hospital and at their request, he remains on Aricept 10 mg daily as well as Lexapro 15 mg, continue to monitor mood and behaviors.    MEDICAL EQUIPMENT NEEDS:  na    DISCHARGE PLAN/FACE TO FACE:  I certify that services are/were furnished while this patient was under the care of a physician and that a physician or an allowed non-physician practitioner (NPP), had a face-to-face encounter that meets the physician face-to-face encounter requirements. The  encounter was in whole, or in part, related to the primary reason for home health. The patient is confined to his/her home and needs intermittent skilled nursing, physical therapy, speech-language pathology, or the continued need for occupational therapy. A plan of care has been established by a physician and is periodically reviewed by a physician.  Date of Face-to-Face Encounter: 10/1/18    I certify that, based on my findings, the following services are medically necessary home health services: [facility] RN and PT/OT to evaluate and treat at home.     My clinical findings support the need for the above skilled services because: patient will be discharging to home.  Patient will need infusion RN assistance per infusion, wound care and performing IADLs and ADLs effectively and safely.     Patient to re-establish plan of care with their PCP within 7 days after leaving TCU.       The care plan has been reviewed and all orders signed. Changes to care plan, if any, as noted. Otherwise, continue care plan of care.  The total time spent with this patient was greater than 40 minutes, with greater than 50% spent in counseling and coordination of care that included multiple issues per discharge.     Electronically signed by: Channing Samayoa NP

## 2021-06-26 NOTE — PROGRESS NOTES
"Progress Notes by Byron Garcia MD at 4/9/2018  2:30 PM     Author: Byron Garcia MD Service: -- Author Type: Physician    Filed: 4/9/2018  3:08 PM Encounter Date: 4/9/2018 Status: Signed    : Byron Garcia MD (Physician)           Click to link to [facility] Note      Assessment:    1. Persistent atrial fibrillation  Holter Monitor       Plan:    1.  Holter monitor; we will call his wife with the results.    An After Visit Summary was printed and given to the patient.    Subjective:    Gilmar Turk returned for follow up visit.    I met with Viet once before in December, 2016.  He failed to follow-up with our heart care team after that despite my request that he do so.  He was in the room alone today when I first entered but clearly has significant cognitive impairment, therefore, I asked his wife, Uzma to join us and Gilmar agreed to do this.    Uzma states that Gilmar is back to \"have his heart checked out\".  We discussed the echocardiogram that had already been done at the [facility] more than a year ago.  Gilmar does not appear to be aware of his atrial fibrillation nor does he have other cardiac symptoms such as chest discomfort, loss of consciousness, lightheadedness, shortness of breath, orthopnea, or lower extremity edema.    Our team had pulled the most recent clinic note from February from the [facility] and I updated his med list by reviewing this as neither Uzma or Viet were familiar with his medications.    Past Medical History:    Patient Active Problem List   Diagnosis   ? Persistent atrial fibrillation   ? Essential hypertension   ? Cognitive impairment       Past Medical History:   Diagnosis Date   ? Cognitive impairment 12/9/2016    Sees neurologist, Dr. Harris, at [facility].       Past Surgical History:   Procedure Laterality Date   ? NO PAST SURGERIES          reports that he has quit smoking. His smoking use " included Cigars. He has never used smokeless tobacco. He reports that he drinks about 16.8 oz of alcohol per week  He reports that he does not use illicit drugs.    Family History   Problem Relation Age of Onset   ? No Medical Problems Son    ? No Medical Problems Daughter    ? Dementia Mother    ? No Medical Problems Sister    ? No Medical Problems Sister    ? No Medical Problems Sister    ? No Medical Problems Half Sister    ? Sudden death Neg Hx    ? Acute Myocardial Infarction Neg Hx    ? Stroke Neg Hx        Outpatient Encounter Prescriptions as of 4/9/2018   Medication Sig Dispense Refill   ? apixaban (ELIQUIS) 5 mg Tab tablet Take 5 mg by mouth 2 (two) times a day.     ? DOCOSAHEXANOIC ACID/EPA (FISH OIL ORAL) Take by mouth.     ? escitalopram oxalate (LEXAPRO) 10 MG tablet Take 10 mg by mouth daily.     ? metoprolol tartrate (LOPRESSOR) 50 MG tablet Take 25 mg by mouth 2 (two) times a day. 50 tablet 6   ? MULTIVITAMIN ORAL Take by mouth.       No facility-administered encounter medications on file as of 4/9/2018.        Review of Systems:     General: WNL  Eyes: WNL  Ears/Nose/Throat: WNL  Lungs: WNL  Heart: WNL  Stomach: WNL  Bladder: WNL  Muscle/Joints: WNL  Skin: WNL  Nervous System: WNL  Mental Health: WNL     Blood: WNL    Objective:     /80 (Patient Site: Left Arm, Patient Position: Sitting, Cuff Size: Adult Regular)  Pulse 66  Resp 12  Ht 6' (1.829 m)  Wt (!) 229 lb (103.9 kg)  BMI 31.06 kg/m2  Wt Readings from Last 5 Encounters:   04/09/18 (!) 229 lb (103.9 kg)   12/30/16 222 lb (100.7 kg)   12/14/16 219 lb (99.3 kg)        Physical Exam:    GENERAL APPEARANCE: alert, no apparent distress  EYES: no scleral icterus  NECK: no carotid bruits or adenopathy, jugular venous pressure is difficult to evaluate due to obesity  CHEST: symmetric, the lungs are clear to auscultation  CARDIOVASCULAR: irregular rhythm without murmur or gallop  EXTREMITIES: no cyanosis, clubbing or edema  SKIN: no  xanthelasma  NEUROLOGIC: normal gait and coordination  PSYCHIATRIC: mood is normal and affect is somewhat flat     Cardiac Testing:    Echocardiogram: The report of the echocardiogram interpreted by Dr. Cindi Langford was personally reviewed; her conclusions were mild left ventricular hypertrophy with normal left ventricular systolic performance, and moderate left and mild right atrial enlargement.    Imaging:    No results found.    Lab Results:    Lab Results   Component Value Date    CREATININE 1.04 11/15/2017    BUN 7 (L) 11/15/2017     11/15/2017    K 4.4 11/15/2017     11/15/2017    CO2 26 11/15/2017     Lab Results   Component Value Date    CHOL 167 02/22/2018    TRIG 62 02/22/2018    HDL 53 02/22/2018     Creatinine (mg/dL)   Date Value   11/15/2017 1.04   12/13/2016 0.95     LDL Calculated (mg/dL)   Date Value   02/22/2018 102     Lab Results   Component Value Date    WBC 3.2 (L) 12/13/2016    HGB 14.8 12/13/2016    HCT 42.3 12/13/2016    MCV 96 12/13/2016    PLT 85 (L) 12/13/2016           Much or all of the text in this note was generated through the use of the Dragon Dictate voice-to-text software. Errors in spelling or words which seem out of context are unintentional. Sound alike errors, in particular, may have escaped editing.

## (undated) RX ORDER — REGADENOSON 0.08 MG/ML
INJECTION, SOLUTION INTRAVENOUS
Status: DISPENSED
Start: 2019-01-01